# Patient Record
Sex: FEMALE | Race: BLACK OR AFRICAN AMERICAN | NOT HISPANIC OR LATINO | URBAN - METROPOLITAN AREA
[De-identification: names, ages, dates, MRNs, and addresses within clinical notes are randomized per-mention and may not be internally consistent; named-entity substitution may affect disease eponyms.]

---

## 2017-02-16 ENCOUNTER — EMERGENCY (EMERGENCY)
Facility: HOSPITAL | Age: 22
LOS: 0 days | Discharge: HOME | End: 2017-02-16
Attending: EMERGENCY MEDICINE
Payer: OTHER MISCELLANEOUS

## 2017-02-16 VITALS
HEIGHT: 60 IN | WEIGHT: 138.01 LBS | SYSTOLIC BLOOD PRESSURE: 125 MMHG | TEMPERATURE: 98 F | OXYGEN SATURATION: 98 % | RESPIRATION RATE: 19 BRPM | HEART RATE: 82 BPM | DIASTOLIC BLOOD PRESSURE: 77 MMHG
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
## 2017-02-16 DIAGNOSIS — T14.8 OTHER INJURY OF UNSPECIFIED BODY REGION: ICD-10-CM
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
## 2017-02-16 DIAGNOSIS — M54.2 CERVICALGIA: ICD-10-CM

## 2017-02-16 PROCEDURE — 72040 X-RAY EXAM NECK SPINE 2-3 VW: CPT | Mod: 26

## 2017-02-16 PROCEDURE — 99284 EMERGENCY DEPT VISIT MOD MDM: CPT

## 2017-02-16 RX ORDER — IBUPROFEN 200 MG
1 TABLET ORAL
Qty: 15 | Refills: 0 | OUTPATIENT
Start: 2017-02-16 | End: 2017-02-21

## 2017-02-16 RX ORDER — CYCLOBENZAPRINE HYDROCHLORIDE 10 MG/1
1 TABLET, FILM COATED ORAL
Qty: 9 | Refills: 0 | OUTPATIENT
Start: 2017-02-16 | End: 2017-02-19

## 2017-02-16 RX ORDER — CYCLOBENZAPRINE HYDROCHLORIDE 10 MG/1
10 TABLET, FILM COATED ORAL ONCE
Qty: 0 | Refills: 0 | Status: COMPLETED | OUTPATIENT
Start: 2017-02-16 | End: 2017-02-16

## 2017-02-16 RX ORDER — KETOROLAC TROMETHAMINE 30 MG/ML
60 SYRINGE (ML) INJECTION ONCE
Qty: 0 | Refills: 0 | Status: DISCONTINUED | OUTPATIENT
Start: 2017-02-16 | End: 2017-02-16

## 2017-02-16 RX ADMIN — CYCLOBENZAPRINE HYDROCHLORIDE 10 MILLIGRAM(S): 10 TABLET, FILM COATED ORAL at 12:28

## 2017-02-16 RX ADMIN — Medication 60 MILLIGRAM(S): at 12:30

## 2017-02-16 RX ADMIN — Medication 60 MILLIGRAM(S): at 12:28

## 2017-02-16 NOTE — ED PROVIDER NOTE - OBJECTIVE STATEMENT
22yo female with no pertinent pmh presents with left lateral neck pain s/p [facility] no [facility].     No fever/chills. No photophobia/eye pain/changes in vision, No ear pain/sore throat/dysphagia, No chest pain/palpitations. No SOB/cough/stridor. No abdominal pain, N/V/D, no black/bloody bm. No dysuria/frequency/discharge, No headache. No Dizziness.  No rash.  No numbness/tingling/weakness.

## 2017-02-16 NOTE — ED ADULT NURSE NOTE - OBJECTIVE STATEMENT
Patient stated that she injured her neck while lifting up a patient, patient is nursing assistant in [employer], she reported pain of 8 on a scale of 0 to 10, left side of neck that radiates to left shoulder.

## 2017-02-16 NOTE — ED PROVIDER NOTE - MEDICAL DECISION MAKING DETAILS
xray no fx. likely muscle spasm, for nsaids, flexeril. Discussed results and outcome of testing with the patient.  Patient given copy of available results. Patient advised to please follow up with their PMD within the next 24 hours and return to the Emergency Department for worsening symptoms or any other concerns.

## 2017-02-16 NOTE — ED ADULT TRIAGE NOTE - CHIEF COMPLAINT QUOTE
pt complaining of neck pain radiating to left shoulder. pt states pain started after she was lifting a patient

## 2017-02-16 NOTE — ED ADULT NURSE NOTE - ADDITIONAL PRINTED INSTRUCTIONS GIVEN
discharged home, instructed to come back to er if symptoms get worse, verbalized understanding of instructions

## 2017-04-02 ENCOUNTER — INPATIENT (INPATIENT)
Facility: HOSPITAL | Age: 22
LOS: 0 days | Discharge: HOME | DRG: 152 | End: 2017-04-03
Attending: HOSPITALIST | Admitting: STUDENT IN AN ORGANIZED HEALTH CARE EDUCATION/TRAINING PROGRAM
Payer: COMMERCIAL

## 2017-04-02 VITALS
WEIGHT: 149.91 LBS | SYSTOLIC BLOOD PRESSURE: 161 MMHG | TEMPERATURE: 97 F | DIASTOLIC BLOOD PRESSURE: 88 MMHG | RESPIRATION RATE: 18 BRPM | HEART RATE: 98 BPM | OXYGEN SATURATION: 94 % | HEIGHT: 60 IN

## 2017-04-02 DIAGNOSIS — A41.9 SEPSIS, UNSPECIFIED ORGANISM: ICD-10-CM

## 2017-04-02 DIAGNOSIS — Z41.8 ENCOUNTER FOR OTHER PROCEDURES FOR PURPOSES OTHER THAN REMEDYING HEALTH STATE: ICD-10-CM

## 2017-04-02 DIAGNOSIS — J05.10 ACUTE EPIGLOTTITIS WITHOUT OBSTRUCTION: ICD-10-CM

## 2017-04-02 LAB
ALBUMIN SERPL ELPH-MCNC: 4.8 G/DL — SIGNIFICANT CHANGE UP (ref 3.3–5)
ALP SERPL-CCNC: 72 U/L — SIGNIFICANT CHANGE UP (ref 40–120)
ALT FLD-CCNC: 10 U/L RC — SIGNIFICANT CHANGE UP (ref 10–45)
ANION GAP SERPL CALC-SCNC: 16 MMOL/L — SIGNIFICANT CHANGE UP (ref 5–17)
APTT BLD: 28.3 SEC — SIGNIFICANT CHANGE UP (ref 27.5–37.4)
AST SERPL-CCNC: 14 U/L — SIGNIFICANT CHANGE UP (ref 10–40)
BASE EXCESS BLDV CALC-SCNC: -0.9 MMOL/L — SIGNIFICANT CHANGE UP (ref -2–2)
BASOPHILS # BLD AUTO: 0 K/UL — SIGNIFICANT CHANGE UP (ref 0–0.2)
BASOPHILS NFR BLD AUTO: 0 % — SIGNIFICANT CHANGE UP (ref 0–2)
BILIRUB SERPL-MCNC: 0.7 MG/DL — SIGNIFICANT CHANGE UP (ref 0.2–1.2)
BUN SERPL-MCNC: 11 MG/DL — SIGNIFICANT CHANGE UP (ref 7–23)
CA-I SERPL-SCNC: 1.19 MMOL/L — SIGNIFICANT CHANGE UP (ref 1.12–1.3)
CALCIUM SERPL-MCNC: 9.2 MG/DL — SIGNIFICANT CHANGE UP (ref 8.4–10.5)
CHLORIDE BLDV-SCNC: 109 MMOL/L — HIGH (ref 96–108)
CHLORIDE SERPL-SCNC: 105 MMOL/L — SIGNIFICANT CHANGE UP (ref 96–108)
CO2 BLDV-SCNC: 26 MMOL/L — SIGNIFICANT CHANGE UP (ref 22–30)
CO2 SERPL-SCNC: 22 MMOL/L — SIGNIFICANT CHANGE UP (ref 22–31)
CREAT SERPL-MCNC: 0.67 MG/DL — SIGNIFICANT CHANGE UP (ref 0.5–1.3)
EOSINOPHIL # BLD AUTO: 0 K/UL — SIGNIFICANT CHANGE UP (ref 0–0.5)
EOSINOPHIL NFR BLD AUTO: 0.2 % — SIGNIFICANT CHANGE UP (ref 0–6)
GAS PNL BLDV: 138 MMOL/L — SIGNIFICANT CHANGE UP (ref 136–145)
GAS PNL BLDV: SIGNIFICANT CHANGE UP
GLUCOSE BLDV-MCNC: 111 MG/DL — HIGH (ref 70–99)
GLUCOSE SERPL-MCNC: 111 MG/DL — HIGH (ref 70–99)
HCG SERPL-ACNC: <2 MIU/ML — SIGNIFICANT CHANGE UP
HCO3 BLDV-SCNC: 24 MMOL/L — SIGNIFICANT CHANGE UP (ref 21–29)
HCT VFR BLD CALC: 41.3 % — SIGNIFICANT CHANGE UP (ref 34.5–45)
HCT VFR BLDA CALC: 41 % — SIGNIFICANT CHANGE UP (ref 39–50)
HGB BLD CALC-MCNC: 13.4 G/DL — SIGNIFICANT CHANGE UP (ref 11.5–15.5)
HGB BLD-MCNC: 13.6 G/DL — SIGNIFICANT CHANGE UP (ref 11.5–15.5)
INR BLD: 1.12 RATIO — SIGNIFICANT CHANGE UP (ref 0.88–1.16)
LACTATE BLDV-MCNC: 1.8 MMOL/L — SIGNIFICANT CHANGE UP (ref 0.7–2)
LYMPHOCYTES # BLD AUTO: 11.1 % — LOW (ref 13–44)
LYMPHOCYTES # BLD AUTO: 2.3 K/UL — SIGNIFICANT CHANGE UP (ref 1–3.3)
MCHC RBC-ENTMCNC: 29.3 PG — SIGNIFICANT CHANGE UP (ref 27–34)
MCHC RBC-ENTMCNC: 32.9 GM/DL — SIGNIFICANT CHANGE UP (ref 32–36)
MCV RBC AUTO: 89.1 FL — SIGNIFICANT CHANGE UP (ref 80–100)
MONOCYTES # BLD AUTO: 1.3 K/UL — HIGH (ref 0–0.9)
MONOCYTES NFR BLD AUTO: 6.3 % — SIGNIFICANT CHANGE UP (ref 2–14)
NEUTROPHILS # BLD AUTO: 16.8 K/UL — HIGH (ref 1.8–7.4)
NEUTROPHILS NFR BLD AUTO: 82.4 % — HIGH (ref 43–77)
PCO2 BLDV: 44 MMHG — SIGNIFICANT CHANGE UP (ref 35–50)
PH BLDV: 7.36 — SIGNIFICANT CHANGE UP (ref 7.35–7.45)
PLATELET # BLD AUTO: 214 K/UL — SIGNIFICANT CHANGE UP (ref 150–400)
PO2 BLDV: 32 MMHG — SIGNIFICANT CHANGE UP (ref 25–45)
POTASSIUM BLDV-SCNC: 3.3 MMOL/L — LOW (ref 3.5–5)
POTASSIUM SERPL-MCNC: 3.6 MMOL/L — SIGNIFICANT CHANGE UP (ref 3.5–5.3)
POTASSIUM SERPL-SCNC: 3.6 MMOL/L — SIGNIFICANT CHANGE UP (ref 3.5–5.3)
PROT SERPL-MCNC: 7.9 G/DL — SIGNIFICANT CHANGE UP (ref 6–8.3)
PROTHROM AB SERPL-ACNC: 12.1 SEC — SIGNIFICANT CHANGE UP (ref 9.8–12.7)
RAPID RVP RESULT: SIGNIFICANT CHANGE UP
RBC # BLD: 4.63 M/UL — SIGNIFICANT CHANGE UP (ref 3.8–5.2)
RBC # FLD: 11.2 % — SIGNIFICANT CHANGE UP (ref 10.3–14.5)
SAO2 % BLDV: 54 % — LOW (ref 67–88)
SODIUM SERPL-SCNC: 143 MMOL/L — SIGNIFICANT CHANGE UP (ref 135–145)
WBC # BLD: 20.3 K/UL — HIGH (ref 3.8–10.5)
WBC # FLD AUTO: 20.3 K/UL — HIGH (ref 3.8–10.5)

## 2017-04-02 PROCEDURE — 99291 CRITICAL CARE FIRST HOUR: CPT

## 2017-04-02 PROCEDURE — 70360 X-RAY EXAM OF NECK: CPT | Mod: 26

## 2017-04-02 RX ORDER — DEXAMETHASONE 0.5 MG/5ML
4 ELIXIR ORAL ONCE
Qty: 0 | Refills: 0 | Status: COMPLETED | OUTPATIENT
Start: 2017-04-02 | End: 2017-04-02

## 2017-04-02 RX ORDER — VANCOMYCIN HCL 1 G
1000 VIAL (EA) INTRAVENOUS EVERY 12 HOURS
Qty: 0 | Refills: 0 | Status: DISCONTINUED | OUTPATIENT
Start: 2017-04-02 | End: 2017-04-02

## 2017-04-02 RX ORDER — DEXAMETHASONE 0.5 MG/5ML
10 ELIXIR ORAL EVERY 12 HOURS
Qty: 0 | Refills: 0 | Status: DISCONTINUED | OUTPATIENT
Start: 2017-04-02 | End: 2017-04-03

## 2017-04-02 RX ORDER — CEFTRIAXONE 500 MG/1
1 INJECTION, POWDER, FOR SOLUTION INTRAMUSCULAR; INTRAVENOUS ONCE
Qty: 0 | Refills: 0 | Status: COMPLETED | OUTPATIENT
Start: 2017-04-02 | End: 2017-04-02

## 2017-04-02 RX ORDER — VANCOMYCIN HCL 1 G
VIAL (EA) INTRAVENOUS
Qty: 0 | Refills: 0 | Status: DISCONTINUED | OUTPATIENT
Start: 2017-04-02 | End: 2017-04-02

## 2017-04-02 RX ORDER — CEFTRIAXONE 500 MG/1
1 INJECTION, POWDER, FOR SOLUTION INTRAMUSCULAR; INTRAVENOUS EVERY 24 HOURS
Qty: 0 | Refills: 0 | Status: DISCONTINUED | OUTPATIENT
Start: 2017-04-02 | End: 2017-04-02

## 2017-04-02 RX ORDER — VANCOMYCIN HCL 1 G
1000 VIAL (EA) INTRAVENOUS ONCE
Qty: 0 | Refills: 0 | Status: COMPLETED | OUTPATIENT
Start: 2017-04-02 | End: 2017-04-02

## 2017-04-02 RX ORDER — DEXAMETHASONE 0.5 MG/5ML
10 ELIXIR ORAL EVERY 6 HOURS
Qty: 0 | Refills: 0 | Status: DISCONTINUED | OUTPATIENT
Start: 2017-04-02 | End: 2017-04-02

## 2017-04-02 RX ORDER — DEXAMETHASONE 0.5 MG/5ML
6 ELIXIR ORAL ONCE
Qty: 0 | Refills: 0 | Status: COMPLETED | OUTPATIENT
Start: 2017-04-02 | End: 2017-04-02

## 2017-04-02 RX ORDER — FAMOTIDINE 10 MG/ML
20 INJECTION INTRAVENOUS ONCE
Qty: 0 | Refills: 0 | Status: COMPLETED | OUTPATIENT
Start: 2017-04-02 | End: 2017-04-02

## 2017-04-02 RX ORDER — CEFTRIAXONE 500 MG/1
2 INJECTION, POWDER, FOR SOLUTION INTRAMUSCULAR; INTRAVENOUS EVERY 24 HOURS
Qty: 0 | Refills: 0 | Status: DISCONTINUED | OUTPATIENT
Start: 2017-04-02 | End: 2017-04-03

## 2017-04-02 RX ORDER — SODIUM CHLORIDE 9 MG/ML
1000 INJECTION, SOLUTION INTRAVENOUS
Qty: 0 | Refills: 0 | Status: DISCONTINUED | OUTPATIENT
Start: 2017-04-02 | End: 2017-04-03

## 2017-04-02 RX ORDER — DEXAMETHASONE 0.5 MG/5ML
10 ELIXIR ORAL EVERY 8 HOURS
Qty: 0 | Refills: 0 | Status: DISCONTINUED | OUTPATIENT
Start: 2017-04-02 | End: 2017-04-02

## 2017-04-02 RX ORDER — DIPHENHYDRAMINE HCL 50 MG
25 CAPSULE ORAL ONCE
Qty: 0 | Refills: 0 | Status: COMPLETED | OUTPATIENT
Start: 2017-04-02 | End: 2017-04-02

## 2017-04-02 RX ADMIN — Medication 25 MILLIGRAM(S): at 05:50

## 2017-04-02 RX ADMIN — SODIUM CHLORIDE 50 MILLILITER(S): 9 INJECTION, SOLUTION INTRAVENOUS at 15:54

## 2017-04-02 RX ADMIN — FAMOTIDINE 20 MILLIGRAM(S): 10 INJECTION INTRAVENOUS at 05:50

## 2017-04-02 RX ADMIN — Medication 102 MILLIGRAM(S): at 14:20

## 2017-04-02 RX ADMIN — Medication 6 MILLIGRAM(S): at 05:50

## 2017-04-02 RX ADMIN — SODIUM CHLORIDE 50 MILLILITER(S): 9 INJECTION, SOLUTION INTRAVENOUS at 23:22

## 2017-04-02 RX ADMIN — CEFTRIAXONE 100 GRAM(S): 500 INJECTION, POWDER, FOR SOLUTION INTRAMUSCULAR; INTRAVENOUS at 06:22

## 2017-04-02 RX ADMIN — Medication 4 MILLIGRAM(S): at 06:23

## 2017-04-02 RX ADMIN — Medication 250 MILLIGRAM(S): at 07:30

## 2017-04-02 NOTE — ED PROVIDER NOTE - MEDICAL DECISION MAKING DETAILS
Resident: throat pain, change in voice will have ENT scope patient, benadryl, steroid, and pepcid Resident: throat pain, change in voice will have ENT scope patient, benadryl, steroid, and pepcid    ATTENDING MD Jose Guadalupe: throat pain, no hx of angioedema or prior reaction. no hard signs of upper airway compromise. pt tachyardic and mildly il appearing. will get ENT to perform nasopharyngoscopy, get imaging PRN. Will get labs and infectious workup.

## 2017-04-02 NOTE — ED PROVIDER NOTE - PROGRESS NOTE DETAILS
ENT notes epiglotitis. We will get X-ray, MICU eval, antibitiocs [facility]

## 2017-04-02 NOTE — H&P ADULT. - LYMPHATIC
anterior cervical L/supraclavicular R/anterior cervical R/posterior cervical L/posterior cervical R/supraclavicular L

## 2017-04-02 NOTE — ED PROVIDER NOTE - OBJECTIVE STATEMENT
21 year old female presents with ear pain, throat pain, throat tightness, change in voice over 1 day period of time. No drooling, no stridor.  No fever, no nausea/vomiting, no shortness of breath. 21 year old female presents with ear pain, throat pain, throat tightness, change in voice over 1 day period of time per family. No drooling, no stridor.  No dysphagia, mild odynophagia to solids. No fever, no nausea/vomiting, no shortness of breath. Thinks she had a smilar episode int he past from a "reaction to something."

## 2017-04-02 NOTE — ED PROVIDER NOTE - ATTENDING CONTRIBUTION TO CARE
ATTENDING MD:  I, Dima Shi, personally have seen and examined this patient.  I have discussed all aspects of care with the resident physician. Resident note reviewed and agree on plan of care and except where noted.  See HPI, PE, and MDM for details.

## 2017-04-02 NOTE — ED PROVIDER NOTE - PHYSICAL EXAMINATION
ATTENDING MD Jose Guadalupe: Generally well appearing, uncomfortable but nontoxic, AOx4. No appreciable dysphonia, no drooling, tolerating secretions. No stridor. No respirator distress. Lungs CTAB. Mild tracheal tenderness. Heart is tachycardic but regular, 2+ distal pulses. Abd is soft, nontender. No CVAT. normal mentation, grossly neurologically intact.    RESIDENT:

## 2017-04-02 NOTE — ED PROVIDER NOTE - CRITICAL CARE PROVIDED
review of prior documents/direct patient care (not related to procedure)/additional history taking/consultation with other physicians/consult w/ pt's family directly relating to pts condition

## 2017-04-02 NOTE — H&P ADULT. - PROBLEM SELECTOR PLAN 2
DVT: HSQ Pt to be admitted to the MICU for airway monitoring in the setting of epiglottitis. RVP panel pending. Continue with IV decadron per ENT recs. VBG shows now evidence of respiratory failure at this time. Patient with elevated white count in the setting of possible infection. Continue with vancomycin. Keep patient NPO. Pt to be admitted to the MICU for airway monitoring in the setting of epiglottitis. RVP panel pending. Continue with IV decadron per ENT recs. VBG shows now evidence of respiratory failure at this time. Patient with elevated white count in the setting of possible infection. Continue with vancomycin, Ceftriaxone. Keep patient NPO. Pt to be admitted to the MICU for airway monitoring in the setting of epiglottitis. RVP panel pending. Continue with IV decadron per ENT recs. VBG shows no evidence of respiratory failure at this time. Patient phonating, tolerating secretions well, can lie at 20 degrees comfortably.  Patient with elevated white count in the setting of possible infection. Continue with Ceftriaxone. Keep patient NPO.

## 2017-04-02 NOTE — ED ADULT NURSE NOTE - OBJECTIVE STATEMENT
20 yo female presents to the ED from home c/o sharp throat pain with left ear pain starting yesterday at 0900. patient states throat pain progressively worsened overnight and now pain is 10/10 when swallowing. patient is AAOx4. lung sounds clear bilaterally, cap refill <3sec. throat is pink in color, no discharge present, neck not tender to touch. patient denies difficulty breathing, SOB, chest pain, N/V/D, HA, chills, cough, HA. MD at the bedside. 22 yo female presents to the ED from home c/o sharp throat pain with left ear pain starting yesterday at 0900. patient states throat pain progressively worsened overnight and now pain is 10/10 when swallowing. patient is AAOx4. lung sounds clear bilaterally, cap refill <3sec. throat is pink in color, no discharge present, neck tender to touch on left side. patient denies difficulty breathing, SOB, chest pain, N/V/D, HA, chills, cough, HA. MD at the bedside.

## 2017-04-02 NOTE — H&P ADULT. - PROBLEM SELECTOR PLAN 1
Pt to be admitted to the MICU for airway monitoring in the setting of epiglottitis. RVP panel pending. Continue with IV decadron per ENT recs. VBG show now evidence of respiratory failure at this time. Patient with elevated white count in the setting of possible infection. Pt to be admitted to the MICU for airway monitoring in the setting of epiglottitis. RVP panel pending. Continue with IV decadron per ENT recs. VBG show now evidence of respiratory failure at this time. Patient with elevated white count in the setting of possible infection. Continue with vancomycin. Meets sepsis criteria based off of Tachycardia, leukocytosis, source of infection (epiglottitis). Will obtain RVP panel, continue vancomycin. BCx pending. Meets sepsis criteria based off of Tachycardia, leukocytosis, source of infection (epiglottitis). Will obtain RVP panel, continue vancomycin, Ceftriaxone. BCx pending. IVF. Meets sepsis criteria based off of Tachycardia, leukocytosis, source of infection (epiglottitis). Will obtain RVP panel, strep culture, continue Ceftriaxone. BCx pending. IVF.

## 2017-04-02 NOTE — H&P ADULT. - HISTORY OF PRESENT ILLNESS
22 yo F no PMH developed L ear pain at 10 am last night which progressed to throat pain. There is severe pain with swallowing and the patient feels the need to spit frequently. The patient is able to drink liquids without coughing or choking. The patient denies any fevers, chills , rhinorrhea. Denies any sick contacts.    In the ED the patient's vitals were:    T 36.3 /88 HR: 98 RR 18 94%RA.    The patient was given 1G ceftriaxone, Vancomycin, 10 mg dexamethasone, benadryl, famotidine. An X ray of the neck was completed which showed Thickening of the epiglottis concerning for epiglottitis. Pt seen by ENT in the ED who rec continuing IV decadron and ICU consultation regarding airway monitoring. 22 yo F no PMH developed L ear pain at 10 am last night which progressed to throat pain. There is severe pain with swallowing and the patient feels the need to spit frequently. The patient is able to drink liquids without coughing or choking. The patient denies any fevers, chills , rhinorrhea. Denies any sick contacts.    In the ED the patient's vitals were:    T 36.3 /88 HR: 98 RR 18 94%RA.    The patient was given 1G ceftriaxone, Vancomycin, 10 mg dexamethasone, benadryl, famotidine. An X ray of the neck was completed which showed Thickening of the epiglottis concerning for epiglottitis. Pt seen by ENT in the ED who rec continuing IV decadron and ICU consultation regarding airway monitoring. ENT noted enlarged epiglottis with no pooling of secretions. Patent airways, with mobile vocal cords. Rec NPO. 22 yo F no PMH developed L ear pain at 10 am yesterday morning which progressed to throat pain. There is severe pain with swallowing and the patient feels the need to spit frequently. The patient is able to drink liquids without coughing or choking. The patient denies any fevers, chills , rhinorrhea. Denies any sick contacts. Patient denies any stridor or wheezing. States she feels as though she has had some change in her voice acutely.    In the ED the patient's vitals were:    T 36.3 /88 HR: 98 RR 18 94%RA.    The patient was given 1G ceftriaxone, Vancomycin, 10 mg dexamethasone, benadryl, famotidine. An X ray of the neck was completed which showed Thickening of the epiglottis concerning for epiglottitis. Pt seen by ENT in the ED who rec continuing IV decadron and ICU consultation regarding airway monitoring. ENT noted enlarged epiglottis with no pooling of secretions. Patent airways, with mobile vocal cords. Rec NPO. 22 yo F no PMH developed L ear pain at 10 am yesterday morning which progressed to throat pain. There is severe pain with swallowing and the patient feels the need to spit frequently. The patient is able to drink liquids without coughing or choking. The patient denies any fevers, chills , rhinorrhea. Denies any sick contacts. Patient denies any stridor or wheezing. States she feels as though she has had some change in her voice acutely.    In the ED the patient's vitals were:    T 36.3 /88 HR: 98 RR 18 94%RA.    The patient was given 1G ceftriaxone, Vancomycin, 10 mg dexamethasone, benadryl, famotidine. An X ray of the neck was completed which showed Thickening of the epiglottis concerning for epiglottitis. Pt seen by ENT in the ED who rec continuing IV decadron and ICU consultation regarding airway monitoring. ENT noted erythematous edematous beefy epiglottis with no pooling of secretions. Patent airways, with mobile vocal cords. Rec NPO.

## 2017-04-03 VITALS
TEMPERATURE: 98 F | HEART RATE: 80 BPM | SYSTOLIC BLOOD PRESSURE: 126 MMHG | DIASTOLIC BLOOD PRESSURE: 70 MMHG | RESPIRATION RATE: 18 BRPM | OXYGEN SATURATION: 99 %

## 2017-04-03 PROCEDURE — 80053 COMPREHEN METABOLIC PANEL: CPT

## 2017-04-03 PROCEDURE — 82947 ASSAY GLUCOSE BLOOD QUANT: CPT

## 2017-04-03 PROCEDURE — 85027 COMPLETE CBC AUTOMATED: CPT

## 2017-04-03 PROCEDURE — 96375 TX/PRO/DX INJ NEW DRUG ADDON: CPT

## 2017-04-03 PROCEDURE — 84702 CHORIONIC GONADOTROPIN TEST: CPT

## 2017-04-03 PROCEDURE — 84132 ASSAY OF SERUM POTASSIUM: CPT

## 2017-04-03 PROCEDURE — 99239 HOSP IP/OBS DSCHRG MGMT >30: CPT

## 2017-04-03 PROCEDURE — 85730 THROMBOPLASTIN TIME PARTIAL: CPT

## 2017-04-03 PROCEDURE — 87040 BLOOD CULTURE FOR BACTERIA: CPT

## 2017-04-03 PROCEDURE — 87486 CHLMYD PNEUM DNA AMP PROBE: CPT

## 2017-04-03 PROCEDURE — 82435 ASSAY OF BLOOD CHLORIDE: CPT

## 2017-04-03 PROCEDURE — 99285 EMERGENCY DEPT VISIT HI MDM: CPT | Mod: 25

## 2017-04-03 PROCEDURE — 82330 ASSAY OF CALCIUM: CPT

## 2017-04-03 PROCEDURE — 85014 HEMATOCRIT: CPT

## 2017-04-03 PROCEDURE — 87581 M.PNEUMON DNA AMP PROBE: CPT

## 2017-04-03 PROCEDURE — 87633 RESP VIRUS 12-25 TARGETS: CPT

## 2017-04-03 PROCEDURE — 96376 TX/PRO/DX INJ SAME DRUG ADON: CPT

## 2017-04-03 PROCEDURE — 87081 CULTURE SCREEN ONLY: CPT

## 2017-04-03 PROCEDURE — 84295 ASSAY OF SERUM SODIUM: CPT

## 2017-04-03 PROCEDURE — 85610 PROTHROMBIN TIME: CPT

## 2017-04-03 PROCEDURE — 82803 BLOOD GASES ANY COMBINATION: CPT

## 2017-04-03 PROCEDURE — 83605 ASSAY OF LACTIC ACID: CPT

## 2017-04-03 PROCEDURE — 87798 DETECT AGENT NOS DNA AMP: CPT

## 2017-04-03 PROCEDURE — 96374 THER/PROPH/DIAG INJ IV PUSH: CPT

## 2017-04-03 PROCEDURE — 70360 X-RAY EXAM OF NECK: CPT

## 2017-04-03 RX ADMIN — Medication 102 MILLIGRAM(S): at 03:07

## 2017-04-03 RX ADMIN — CEFTRIAXONE 100 GRAM(S): 500 INJECTION, POWDER, FOR SOLUTION INTRAMUSCULAR; INTRAVENOUS at 05:40

## 2017-04-03 RX ADMIN — Medication 102 MILLIGRAM(S): at 14:09

## 2017-04-03 NOTE — DISCHARGE NOTE ADULT - HOSPITAL COURSE
21F no significant PMH presents for severe throat pain with associated difficulty swallowing and decreased ability to tolerate secretions. CT neck showed 21F no significant PMH presents for severe throat pain with associated difficulty swallowing and decreased ability to tolerate secretions. She also was experiencing ear pain. CT neck showed swelling of the epiglottis. ENT evaluated and recommended ICU monitoring in case of airway compromise. She was started on CTX and vancomycin empirically. She was given decadron. The following day, she had interval improvement in her edema. Her steroids were tapered, and she was cleared for discharge. She was discharged on a 10 day course of Augmentin and steroid taper with instructions to follow up with her PMD this week. She was instructed to return to the ED if she experienced worsened swelling, pain, or difficulty breathing. 21F no significant PMH presents for severe throat pain with associated difficulty swallowing and decreased ability to tolerate secretions. She also was experiencing ear pain. CT neck showed swelling of the epiglottis. ENT evaluated and recommended ICU monitoring in case of airway compromise. She was started on CTX and vancomycin empirically. She was given decadron. The following day, she had interval improvement in her edema. Her steroids were tapered, and she was cleared for discharge. She was discharged on a 7 day course of Clindamycin and steroid taper with instructions to follow up with her PMD this week. She was instructed to return to the ED if she experienced worsened swelling, pain, or difficulty breathing. She was instructed to follow up with ENT. 21F no significant PMH presents for severe throat pain with associated difficulty swallowing and decreased ability to tolerate secretions. She also was experiencing ear pain. CT neck showed swelling of the epiglottis. ENT evaluated and recommended ICU monitoring in case of airway compromise. She was started on CTX and vancomycin empirically. She was given decadron. The following day, she had interval improvement in her edema. Her steroids were tapered, and she was cleared for discharge. She was discharged on a 7 day course of Clindamycin and steroid taper with instructions to follow up with her PMD this week. She was instructed to return to the ED if she experienced worsened swelling, pain, or difficulty breathing. She was instructed to follow up with ENT.    post d/c addendum: Sepsis not present on admission

## 2017-04-03 NOTE — DISCHARGE NOTE ADULT - MEDICATION SUMMARY - MEDICATIONS TO TAKE
I will START or STAY ON the medications listed below when I get home from the hospital:    predniSONE 10 mg oral tablet  -- 4 tabs by mouth once a day x 4 days  3 tabs by mouth once a day x 4 days  2 tabs by mouth once a day x 4 days  1 tabs by mouth once a day x 4 days  -- It is very important that you take or use this exactly as directed.  Do not skip doses or discontinue unless directed by your doctor.  Obtain medical advice before taking any non-prescription drugs as some may affect the action of this medication.  Take with food or milk.    -- Indication: For Epiglottitis     mg oral tablet  -- 1 tab(s) by mouth every 8 hours PRN for pain  -- Do not take this drug if you are pregnant.  It is very important that you take or use this exactly as directed.  Do not skip doses or discontinue unless directed by your doctor.  May cause drowsiness or dizziness.  Obtain medical advice before taking any non-prescription drugs as some may affect the action of this medication.  Take with food or milk.      -- Indication: For Pain    cyclobenzaprine 10 mg oral tablet  -- 1 tab(s) by mouth 3 times a day PRN for pain  -- May cause drowsiness.  Alcohol may intensify this effect.  Use care when operating dangerous machinery.  Obtain medical advice before taking any non-prescription drugs as some may affect the action of this medication.      -- Indication: For Muscle spasms    amoxicillin-clavulanate 875 mg-125 mg oral tablet  -- 1 tab(s) by mouth 2 times a day  -- Indication: For Epiglottitis I will START or STAY ON the medications listed below when I get home from the hospital:    predniSONE 10 mg oral tablet  -- 4 tabs by mouth once a day x 4 days  3 tabs by mouth once a day x 4 days  2 tabs by mouth once a day x 4 days  1 tabs by mouth once a day x 4 days  -- It is very important that you take or use this exactly as directed.  Do not skip doses or discontinue unless directed by your doctor.  Obtain medical advice before taking any non-prescription drugs as some may affect the action of this medication.  Take with food or milk.    -- Indication: For Epiglottitis     mg oral tablet  -- 1 tab(s) by mouth every 8 hours PRN for pain  -- Do not take this drug if you are pregnant.  It is very important that you take or use this exactly as directed.  Do not skip doses or discontinue unless directed by your doctor.  May cause drowsiness or dizziness.  Obtain medical advice before taking any non-prescription drugs as some may affect the action of this medication.  Take with food or milk.      -- Indication: For Pain    clindamycin 300 mg oral capsule  -- 2 cap(s) by mouth 3 times a day for 7 days.  -- Finish all this medication unless otherwise directed by prescriber.  Medication should be taken with plenty of water.    -- Indication: For Epiglottitis    cyclobenzaprine 10 mg oral tablet  -- 1 tab(s) by mouth 3 times a day PRN for pain  -- May cause drowsiness.  Alcohol may intensify this effect.  Use care when operating dangerous machinery.  Obtain medical advice before taking any non-prescription drugs as some may affect the action of this medication.      -- Indication: For Muscle spasms

## 2017-04-03 NOTE — DISCHARGE NOTE ADULT - CARE PROVIDER_API CALL
PRISCILA,   Phone: (   )    -  Fax: (   )    - PMD,   Phone: (   )    -  Fax: (   )    -    Howard Chavez), Otolaryngology  [address] [place] [address]  Phone: [phone]  Fax: [phone]

## 2017-04-03 NOTE — DISCHARGE NOTE ADULT - PLAN OF CARE
Follow up with your primary care physician within 3-5 days. You had a severe throat infection. Complete your course of antibiotics and steroids as prescribed. Complete your course of steroids as prescribed. You had a severe throat infection. Complete your course of antibiotics and steroids as prescribed. Complete your course of steroids as prescribed. Follow up with your primary care doctor within 3-5 days of discharge. Return to the ED if you experience worsening throat pain, throat swelling, or difficulty breathing. You had a severe throat infection. Complete your course of antibiotics and steroids as prescribed. Complete your course of steroids as prescribed. Follow up with your primary care doctor within 3-5 days of discharge. Return to the ED if you experience worsening throat pain, throat swelling, or difficulty breathing. Follow up with ENT Dr. Chavez within 2 weeks of discharge.

## 2017-04-03 NOTE — DISCHARGE NOTE ADULT - PATIENT PORTAL LINK FT
“You can access the [facility] Patient Portal, offered by [facility], by registering with the following website: [email or web address]”

## 2017-04-03 NOTE — DISCHARGE NOTE ADULT - CARE PLAN
Principal Discharge DX:	Epiglottitis  Goal:	Follow up with your primary care physician within 3-5 days.  Instructions for follow-up, activity and diet:	You had a severe throat infection. Complete your course of antibiotics and steroids as prescribed. Complete your course of steroids as prescribed. Principal Discharge DX:	Epiglottitis  Goal:	Follow up with your primary care physician within 3-5 days.  Instructions for follow-up, activity and diet:	You had a severe throat infection. Complete your course of antibiotics and steroids as prescribed. Complete your course of steroids as prescribed. Follow up with your primary care doctor within 3-5 days of discharge. Return to the ED if you experience worsening throat pain, throat swelling, or difficulty breathing. Principal Discharge DX:	Epiglottitis  Goal:	Follow up with your primary care physician within 3-5 days.  Instructions for follow-up, activity and diet:	You had a severe throat infection. Complete your course of antibiotics and steroids as prescribed. Complete your course of steroids as prescribed. Follow up with your primary care doctor within 3-5 days of discharge. Return to the ED if you experience worsening throat pain, throat swelling, or difficulty breathing. Follow up with ENT Dr. Chavez within 2 weeks of discharge.

## 2017-04-03 NOTE — DISCHARGE NOTE ADULT - PROVIDER TOKENS
FREE:[LAST:[PMD],PHONE:[(   )    -],FAX:[(   )    -]] FREE:[LAST:[PMD],PHONE:[(   )    -],FAX:[(   )    -]],TOKEN:'[id number]'

## 2017-04-04 LAB
CULTURE RESULTS: SIGNIFICANT CHANGE UP
SPECIMEN SOURCE: SIGNIFICANT CHANGE UP

## 2017-04-07 LAB
CULTURE RESULTS: SIGNIFICANT CHANGE UP
CULTURE RESULTS: SIGNIFICANT CHANGE UP
SPECIMEN SOURCE: SIGNIFICANT CHANGE UP
SPECIMEN SOURCE: SIGNIFICANT CHANGE UP

## 2017-04-08 ENCOUNTER — EMERGENCY (EMERGENCY)
Facility: HOSPITAL | Age: 22
LOS: 1 days | Discharge: HOME | End: 2017-04-08
Attending: EMERGENCY MEDICINE | Admitting: EMERGENCY MEDICINE
Payer: COMMERCIAL

## 2017-04-08 VITALS
DIASTOLIC BLOOD PRESSURE: 71 MMHG | RESPIRATION RATE: 20 BRPM | SYSTOLIC BLOOD PRESSURE: 130 MMHG | HEART RATE: 73 BPM | TEMPERATURE: 98 F | OXYGEN SATURATION: 100 %

## 2017-04-08 DIAGNOSIS — R07.0 PAIN IN THROAT: ICD-10-CM

## 2017-04-08 DIAGNOSIS — Z79.899 OTHER LONG TERM (CURRENT) DRUG THERAPY: ICD-10-CM

## 2017-04-08 PROCEDURE — 96374 THER/PROPH/DIAG INJ IV PUSH: CPT

## 2017-04-08 PROCEDURE — 99284 EMERGENCY DEPT VISIT MOD MDM: CPT | Mod: 25

## 2017-04-08 PROCEDURE — 96375 TX/PRO/DX INJ NEW DRUG ADDON: CPT

## 2017-04-08 RX ORDER — DEXAMETHASONE 0.5 MG/5ML
12 ELIXIR ORAL ONCE
Qty: 0 | Refills: 0 | Status: COMPLETED | OUTPATIENT
Start: 2017-04-08 | End: 2017-04-08

## 2017-04-08 RX ADMIN — Medication 100 MILLIGRAM(S): at 23:48

## 2017-04-08 RX ADMIN — Medication 106 MILLIGRAM(S): at 23:19

## 2017-04-08 NOTE — ED ADULT TRIAGE NOTE - ARRIVAL INFO ADDITIONAL COMMENTS
pt states symptoms started after eating chinese food/vss/stopped taking clinda x 2days and took one tonight

## 2017-04-08 NOTE — ED PROVIDER NOTE - OBJECTIVE STATEMENT
21F recently a/w epiglottitis p/w foreign body sensation in throat after eating Chinese food this evening. Pt states that after discharge she had been swallowing without issue but today noted the symptoms after eating fried rice. No known food allergies and has eaten that meal at that restaurant many times in the past without issue. Denies shortness of breath or difficulty handling secretions. Of note, pt confused by discharge instructions of antibiotics and steroid dosing, has not been taking appropriately since dc (took clinda once a day and one prednisone tab daily). Reports daily marijuana use, "a few puffs at a time," last used this AM.

## 2017-04-08 NOTE — ED PROVIDER NOTE - MEDICAL DECISION MAKING DETAILS
MD Fermin,Attending: pt seen and examined . agree with above HPI/ROs/PE. Pt admitted overnite on 2 April for endoscopically verified epiglotittis and d/c home on 3 Aprilon prednisone taper/clindamycin and cyclobenzaprine. Ate Chinese food tonite (fried rice) --felt like it was going down the wrong way, vomited multiple times and now feels discomfort in throat. No fever/chills. Able to swallow. airway patent. oropharynx NAD . Likely irritation of already inflamed epiglottitis by vomiting. No suspicion for FB. Pt did miss one day of prednisone yesterday and was encouraged to take medications completely as prescribed until finished. Return for increased pain/trouble swallowing/change in voice or any other concerns and follow up with ENT as scheduled MD Fermin,Attending: pt seen and examined . agree with above HPI/ROs/PE. Pt admitted overnite on 2 April for endoscopically verified epiglotittis and d/c home on 3 Aprilon prednisone taper/clindamycin and cyclobenzaprine. Ate Chinese food tonite (fried rice) --felt like it was going down the wrong way, vomited multiple times and now feels discomfort in throat. No fever/chills. Able to swallow. airway patent. oropharynx NAD . Likely irritation of already inflamed epiglottitis by vomiting. No suspicion for FB. Pt confused bout scripts and has not been taking clindamycin or prednisone as prescribed (confused with dose).was encouraged to take medications completely as prescribed until finished. Return for increased pain/trouble swallowing/change in voice or any other concerns and follow up with ENT as scheduled. for IV Clindamycin and dexamthasone once here prior to d/c. MD Fermin,Attending: pt seen and examined . agree with above HPI/ROs/PE. Pt admitted overnite on 2 April for endoscopically verified epiglotittis and d/c home on 3 Aprilon prednisone taper/clindamycin and cyclobenzaprine. Ate Chinese food tonite (fried rice) --felt like it was going down the wrong way, vomited multiple times and now feels discomfort in throat. No fever/chills. Able to swallow. airway patent. oropharynx NAD . Likely irritation of already inflamed epiglottitis by vomiting. No suspicion for FB. Pt confused bout scripts and has not been taking clindamycin or prednisone as prescribed (confused with dose).was encouraged to take medications completely as prescribed until finished. Return for increased pain/trouble swallowing/change in voice or any other concerns and follow up with ENT as scheduled. for IV Clindamycin and dexamethasone once here prior to d/c.

## 2017-04-08 NOTE — ED ADULT NURSE NOTE - OBJECTIVE STATEMENT
2201 pt 21y f aox3 vss at triage walked in c/o "my throat feels funny after eating chinese rice" was in icu last week for epiglottitis? issued clinda antibx had stopped for 2days bec of vag itching/diarrhea, took one tonight before coming to ed, pending eval/gcruz Pt is a 20 yo female with the c.o feeling as if something is caught in her throat. Pt was recently admitted to the ICU dx with epiglottis. She was d/john with steroids and an antibiotic. Pt states that after eating rice for dinner she felt as if something was struck. She had two episodes of vomiting at home. She Pt is a 20 yo female with the c.o feeling as if something is caught in her throat. Pt was recently admitted to the ICU dx with epiglottis. She was d/john with steroids and an antibiotic. Pt states that after eating rice for dinner she felt as if something was struck. She had two episodes of vomiting at home. She states that she hasn't been taking the medications correctly and missed a dose yesterday. Pts O2 sat is at 100% she is maintaining her secretions she denies any diff breathing. She is currently denying any N/V/D no CP or SOB no cough fever or chills. Pt denies any pmh and is up to date with her vaccines

## 2017-04-09 VITALS
OXYGEN SATURATION: 100 % | DIASTOLIC BLOOD PRESSURE: 82 MMHG | SYSTOLIC BLOOD PRESSURE: 146 MMHG | TEMPERATURE: 98 F | HEART RATE: 86 BPM | RESPIRATION RATE: 18 BRPM

## 2017-07-20 ENCOUNTER — EMERGENCY (EMERGENCY)
Facility: HOSPITAL | Age: 22
LOS: 0 days | Discharge: HOME | End: 2017-07-20
Attending: EMERGENCY MEDICINE
Payer: OTHER MISCELLANEOUS

## 2017-07-20 VITALS
SYSTOLIC BLOOD PRESSURE: 107 MMHG | DIASTOLIC BLOOD PRESSURE: 63 MMHG | HEART RATE: 76 BPM | TEMPERATURE: 99 F | RESPIRATION RATE: 16 BRPM | OXYGEN SATURATION: 98 % | HEIGHT: 60 IN | WEIGHT: 138.01 LBS

## 2017-07-20 DIAGNOSIS — M54.9 DORSALGIA, UNSPECIFIED: ICD-10-CM
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
## 2017-07-20 DIAGNOSIS — S30.0XXA CONTUSION OF LOWER BACK AND PELVIS, INITIAL ENCOUNTER: ICD-10-CM

## 2017-07-20 DIAGNOSIS — S70.02XA CONTUSION OF LEFT HIP, INITIAL ENCOUNTER: ICD-10-CM

## 2017-07-20 PROCEDURE — 99283 EMERGENCY DEPT VISIT LOW MDM: CPT

## 2017-07-20 RX ORDER — IBUPROFEN 200 MG
1 TABLET ORAL
Qty: 20 | Refills: 0 | OUTPATIENT
Start: 2017-07-20

## 2017-07-20 RX ORDER — METHOCARBAMOL 500 MG/1
1 TABLET, FILM COATED ORAL
Qty: 15 | Refills: 0 | OUTPATIENT
Start: 2017-07-20 | End: 2017-07-25

## 2017-07-20 RX ORDER — IBUPROFEN 200 MG
600 TABLET ORAL ONCE
Qty: 0 | Refills: 0 | Status: COMPLETED | OUTPATIENT
Start: 2017-07-20 | End: 2017-07-20

## 2017-07-20 RX ORDER — METHOCARBAMOL 500 MG/1
750 TABLET, FILM COATED ORAL ONCE
Qty: 0 | Refills: 0 | Status: COMPLETED | OUTPATIENT
Start: 2017-07-20 | End: 2017-07-20

## 2017-07-20 RX ADMIN — Medication 600 MILLIGRAM(S): at 10:03

## 2017-07-20 RX ADMIN — METHOCARBAMOL 750 MILLIGRAM(S): 500 TABLET, FILM COATED ORAL at 10:03

## 2017-07-20 NOTE — ED ADULT NURSE NOTE - OBJECTIVE STATEMENT
pt received alert and oriented x3, pt states she slipped while at work and fell and hurt her left side of hip and back

## 2017-07-20 NOTE — ED PROVIDER NOTE - MEDICAL DECISION MAKING DETAILS
21yo female s/p fall with soft tissue contusions; no evidence of bony derformity; +NVI improved with NSAIDS and muscle relaxants

## 2017-07-20 NOTE — ED PROVIDER NOTE - OBJECTIVE STATEMENT
21yo female s/p slip and fall on wet floor 2 days ago while at work.  Pt states she fell onto her left side bracing herself prior to hitting her head; no subsequent LOC, n/v, dizziness, numbness/weakness/tingling to ext.  Pt states that she has been ambulatory since the incident but report 8/10 achy pain greatest to the left thigh but involves the entire left side. no urinary or fecal incontinence

## 2017-07-20 NOTE — ED PROVIDER NOTE - CHPI ED SYMPTOMS NEG
no bleeding/no fever/no numbness/no vomiting/no confusion/no deformity/no loss of consciousness/no tingling/no weakness

## 2017-08-03 PROBLEM — Z00.00 ENCOUNTER FOR PREVENTIVE HEALTH EXAMINATION: Status: ACTIVE | Noted: 2017-08-03

## 2017-08-07 ENCOUNTER — APPOINTMENT (OUTPATIENT)
Dept: ORTHOPEDIC SURGERY | Facility: CLINIC | Age: 22
End: 2017-08-07

## 2017-08-11 ENCOUNTER — APPOINTMENT (OUTPATIENT)
Dept: ORTHOPEDIC SURGERY | Facility: CLINIC | Age: 22
End: 2017-08-11
Payer: OTHER MISCELLANEOUS

## 2017-08-11 VITALS
DIASTOLIC BLOOD PRESSURE: 78 MMHG | HEART RATE: 84 BPM | SYSTOLIC BLOOD PRESSURE: 116 MMHG | BODY MASS INDEX: 27.48 KG/M2 | HEIGHT: 60 IN | WEIGHT: 140 LBS

## 2017-08-11 DIAGNOSIS — Z78.9 OTHER SPECIFIED HEALTH STATUS: ICD-10-CM

## 2017-08-11 PROCEDURE — 72190 X-RAY EXAM OF PELVIS: CPT

## 2017-08-11 PROCEDURE — 99204 OFFICE O/P NEW MOD 45 MIN: CPT

## 2017-08-11 PROCEDURE — 72100 X-RAY EXAM L-S SPINE 2/3 VWS: CPT

## 2017-08-11 RX ORDER — IBUPROFEN 800 MG/1
800 TABLET, FILM COATED ORAL 3 TIMES DAILY
Qty: 90 | Refills: 0 | Status: ACTIVE | COMMUNITY
Start: 2017-08-11 | End: 1900-01-01

## 2017-08-22 ENCOUNTER — APPOINTMENT (OUTPATIENT)
Dept: ORTHOPEDIC SURGERY | Facility: CLINIC | Age: 22
End: 2017-08-22
Payer: OTHER MISCELLANEOUS

## 2017-08-22 VITALS — HEIGHT: 60 IN | BODY MASS INDEX: 27.48 KG/M2 | WEIGHT: 140 LBS

## 2017-08-22 DIAGNOSIS — M70.62 TROCHANTERIC BURSITIS, LEFT HIP: ICD-10-CM

## 2017-08-22 PROCEDURE — 99213 OFFICE O/P EST LOW 20 MIN: CPT

## 2017-09-21 ENCOUNTER — APPOINTMENT (OUTPATIENT)
Dept: ORTHOPEDIC SURGERY | Facility: CLINIC | Age: 22
End: 2017-09-21
Payer: OTHER MISCELLANEOUS

## 2017-09-21 DIAGNOSIS — M54.5 LOW BACK PAIN: ICD-10-CM

## 2017-09-21 DIAGNOSIS — S70.02XA CONTUSION OF LEFT HIP, INITIAL ENCOUNTER: ICD-10-CM

## 2017-09-21 DIAGNOSIS — S33.5XXA SPRAIN OF LIGAMENTS OF LUMBAR SPINE, INITIAL ENCOUNTER: ICD-10-CM

## 2017-09-21 PROCEDURE — 99214 OFFICE O/P EST MOD 30 MIN: CPT

## 2017-09-21 RX ORDER — DICLOFENAC SODIUM 75 MG/1
75 TABLET, DELAYED RELEASE ORAL
Qty: 60 | Refills: 0 | Status: ACTIVE | COMMUNITY
Start: 2017-09-21 | End: 1900-01-01

## 2018-03-01 ENCOUNTER — OUTPATIENT (OUTPATIENT)
Dept: OUTPATIENT SERVICES | Facility: HOSPITAL | Age: 23
LOS: 1 days | End: 2018-03-01
Payer: MEDICAID

## 2018-03-01 PROCEDURE — G9001: CPT

## 2018-03-13 ENCOUNTER — EMERGENCY (EMERGENCY)
Facility: HOSPITAL | Age: 23
LOS: 0 days | Discharge: HOME | End: 2018-03-13
Attending: EMERGENCY MEDICINE
Payer: COMMERCIAL

## 2018-03-13 VITALS
SYSTOLIC BLOOD PRESSURE: 115 MMHG | OXYGEN SATURATION: 99 % | WEIGHT: 138.01 LBS | HEIGHT: 60 IN | RESPIRATION RATE: 20 BRPM | TEMPERATURE: 98 F | DIASTOLIC BLOOD PRESSURE: 51 MMHG | HEART RATE: 76 BPM

## 2018-03-13 DIAGNOSIS — H91.92 UNSPECIFIED HEARING LOSS, LEFT EAR: ICD-10-CM

## 2018-03-13 DIAGNOSIS — Z79.1 LONG TERM (CURRENT) USE OF NON-STEROIDAL ANTI-INFLAMMATORIES (NSAID): ICD-10-CM

## 2018-03-13 DIAGNOSIS — H92.02 OTALGIA, LEFT EAR: ICD-10-CM

## 2018-03-13 DIAGNOSIS — J34.89 OTHER SPECIFIED DISORDERS OF NOSE AND NASAL SINUSES: ICD-10-CM

## 2018-03-13 DIAGNOSIS — R05 COUGH: ICD-10-CM

## 2018-03-13 DIAGNOSIS — J05.10 ACUTE EPIGLOTTITIS WITHOUT OBSTRUCTION: ICD-10-CM

## 2018-03-13 PROCEDURE — 99283 EMERGENCY DEPT VISIT LOW MDM: CPT | Mod: 25

## 2018-03-13 RX ORDER — PSEUDOEPHEDRINE HCL 30 MG
1 TABLET ORAL
Qty: 12 | Refills: 0 | OUTPATIENT
Start: 2018-03-13 | End: 2018-03-15

## 2018-03-13 NOTE — ED PROVIDER NOTE - OBJECTIVE STATEMENT
21 y/o female hx of epiglottis last year c/o left ear pain/decreased hearing in left ear since last night. States happens every where when gets sick. Has had some rhinorrhea, sneezing, mild cough. No fever/chills, no airway issues/difficulty swallowing/throat pain. Denies other symptoms     ROS: No fever/chills. No eye pain/changes in vision, No sore throat/dysphagia, No chest pain/palpitations. No SOB/cough/. No abdominal pain, N/V/D, no black/bloody bm. No dysuria/frequency/discharge, No headache. No Dizziness.    No rashes or breaks in skin. No numbness/tingling/weakness.

## 2018-03-13 NOTE — ED ADULT TRIAGE NOTE - CHIEF COMPLAINT QUOTE
"I can't hear out of my left ear" Reports having left ear pain starting last night, with difficulty hearing. denies fever chills. Reports having hx of epiglottitis.

## 2018-03-13 NOTE — ED ADULT NURSE NOTE - CAS TRG GENERAL NORM CIRC DET
No visible significant external bleeding/Capillary refill less/equal to 2 seconds/Strong peripheral pulses

## 2018-03-13 NOTE — ED PROVIDER NOTE - PHYSICAL EXAMINATION
Gen: No acute distress, non toxic  HEENT: Mucous membranes moist, pink conjunctivae, EOMI. nl light reflex b/l tm. slight effusion behind left ear decreased hearing on left. no mastoid ttp  CV: RRR, nl s1/s2.  Resp: CTAB, normal rate and effort  GI: Abdomen soft, NT, ND. No rebound, no guarding  : No CVAT  Neuro: A&O x 3, moving all 4 extremities  MSK: No spine or joint tenderness to palpation  Skin: No rashes. intact and perfused.

## 2018-03-13 NOTE — ED ADULT NURSE NOTE - OBJECTIVE STATEMENT
Reports having left ear pain starting yesterday, with difficulty hearing, reports having history of epiglotitis.

## 2018-03-14 DIAGNOSIS — R69 ILLNESS, UNSPECIFIED: ICD-10-CM

## 2018-08-29 ENCOUNTER — EMERGENCY (EMERGENCY)
Facility: HOSPITAL | Age: 23
LOS: 0 days | Discharge: HOME | End: 2018-08-29
Attending: EMERGENCY MEDICINE
Payer: COMMERCIAL

## 2018-08-29 VITALS
DIASTOLIC BLOOD PRESSURE: 80 MMHG | OXYGEN SATURATION: 99 % | SYSTOLIC BLOOD PRESSURE: 140 MMHG | TEMPERATURE: 98 F | RESPIRATION RATE: 20 BRPM | HEIGHT: 60 IN | WEIGHT: 134.04 LBS | HEART RATE: 68 BPM

## 2018-08-29 DIAGNOSIS — R11.2 NAUSEA WITH VOMITING, UNSPECIFIED: ICD-10-CM

## 2018-08-29 DIAGNOSIS — R10.13 EPIGASTRIC PAIN: ICD-10-CM

## 2018-08-29 DIAGNOSIS — Z79.1 LONG TERM (CURRENT) USE OF NON-STEROIDAL ANTI-INFLAMMATORIES (NSAID): ICD-10-CM

## 2018-08-29 PROBLEM — J05.10 ACUTE EPIGLOTTITIS WITHOUT OBSTRUCTION: Chronic | Status: ACTIVE | Noted: 2017-04-08

## 2018-08-29 PROCEDURE — 99283 EMERGENCY DEPT VISIT LOW MDM: CPT

## 2018-08-29 RX ORDER — ONDANSETRON 8 MG/1
1 TABLET, FILM COATED ORAL
Qty: 9 | Refills: 0 | OUTPATIENT
Start: 2018-08-29 | End: 2018-08-31

## 2018-08-29 RX ORDER — ONDANSETRON 8 MG/1
4 TABLET, FILM COATED ORAL ONCE
Qty: 0 | Refills: 0 | Status: COMPLETED | OUTPATIENT
Start: 2018-08-29 | End: 2018-08-29

## 2018-08-29 RX ORDER — IBUPROFEN 200 MG
600 TABLET ORAL ONCE
Qty: 0 | Refills: 0 | Status: COMPLETED | OUTPATIENT
Start: 2018-08-29 | End: 2018-08-29

## 2018-08-29 RX ORDER — IBUPROFEN 200 MG
1 TABLET ORAL
Qty: 20 | Refills: 0 | OUTPATIENT
Start: 2018-08-29 | End: 2018-09-02

## 2018-08-29 RX ADMIN — Medication 600 MILLIGRAM(S): at 10:47

## 2018-08-29 RX ADMIN — ONDANSETRON 4 MILLIGRAM(S): 8 TABLET, FILM COATED ORAL at 10:47

## 2018-08-29 NOTE — ED PROVIDER NOTE - MEDICAL DECISION MAKING DETAILS
24 yo F with nausea/vomiting, refusing labs  -zofran/motrin prn  -f/u with pcp outpt.   -pt. understands to return if symptoms worsen or don't improve

## 2018-08-29 NOTE — ED PROVIDER NOTE - PHYSICAL EXAMINATION
Vitals: WNL  Gen: AAOx3, NAD, sitting comfortably in stretcher, non-toxic, pleasant demeanor   Head: ncat, perrla, eomi b/l  Neck: supple, no lymphadenopathy, no midline deviation  Heart: rrr, no m/r/g  Lungs: CTA b/l, no rales/ronchi/wheezes  Abd: soft, nontender, non-distended, no rebound or guarding  Ext: no clubbing/cyanosis/edema  Neuro: sensation and muscle strength intact b/l, steady gait

## 2018-08-29 NOTE — ED PROVIDER NOTE - OBJECTIVE STATEMENT
24 yo F with epigastric cramping, n/v, vomited once this morning while at work.  She still feels nauseas.  She doesn't want labs draw or urine sent, she doesn't think she's pregnant.  She picked up a cold and wants to go home and rest.  No other complaints.   ROS: negative for fever, cough, headache, chest pain, shortness of breath, abd pain, nausea, vomiting, diarrhea, rash, paresthesia, and weakness--all other systems reviewed are negative.   PMH: negative; Meds: Denies; SH: Denies smoking/drinking/drug use

## 2019-02-24 ENCOUNTER — EMERGENCY (EMERGENCY)
Facility: HOSPITAL | Age: 24
LOS: 0 days | Discharge: HOME | End: 2019-02-24
Attending: EMERGENCY MEDICINE
Payer: COMMERCIAL

## 2019-02-24 VITALS
WEIGHT: 130.07 LBS | TEMPERATURE: 98 F | HEIGHT: 60 IN | OXYGEN SATURATION: 99 % | DIASTOLIC BLOOD PRESSURE: 70 MMHG | RESPIRATION RATE: 17 BRPM | SYSTOLIC BLOOD PRESSURE: 122 MMHG | HEART RATE: 74 BPM
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
## 2019-02-24 DIAGNOSIS — S43.401A UNSPECIFIED SPRAIN OF RIGHT SHOULDER JOINT, INITIAL ENCOUNTER: ICD-10-CM

## 2019-02-24 DIAGNOSIS — Z79.1 LONG TERM (CURRENT) USE OF NON-STEROIDAL ANTI-INFLAMMATORIES (NSAID): ICD-10-CM

## 2019-02-24 DIAGNOSIS — R07.1 CHEST PAIN ON BREATHING: ICD-10-CM

## 2019-02-24 DIAGNOSIS — J05.10 ACUTE EPIGLOTTITIS WITHOUT OBSTRUCTION: ICD-10-CM
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
## 2019-02-24 DIAGNOSIS — M25.511 PAIN IN RIGHT SHOULDER: ICD-10-CM

## 2019-02-24 PROCEDURE — 71045 X-RAY EXAM CHEST 1 VIEW: CPT | Mod: 26

## 2019-02-24 PROCEDURE — 99283 EMERGENCY DEPT VISIT LOW MDM: CPT | Mod: 25

## 2019-02-24 PROCEDURE — 73030 X-RAY EXAM OF SHOULDER: CPT | Mod: 26,RT

## 2019-02-24 RX ORDER — IBUPROFEN 200 MG
1 TABLET ORAL
Qty: 20 | Refills: 0 | OUTPATIENT
Start: 2019-02-24 | End: 2019-02-28

## 2019-02-24 RX ORDER — KETOROLAC TROMETHAMINE 30 MG/ML
60 SYRINGE (ML) INJECTION ONCE
Qty: 0 | Refills: 0 | Status: DISCONTINUED | OUTPATIENT
Start: 2019-02-24 | End: 2019-02-24

## 2019-02-24 RX ORDER — METHOCARBAMOL 500 MG/1
2 TABLET, FILM COATED ORAL
Qty: 30 | Refills: 0 | OUTPATIENT
Start: 2019-02-24 | End: 2019-02-28

## 2019-02-24 RX ADMIN — Medication 60 MILLIGRAM(S): at 08:42

## 2019-02-24 NOTE — ED ADULT NURSE NOTE - OBJECTIVE STATEMENT
Pt is a 23YOF who hurt her right shoulder while at work. 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

## 2019-02-24 NOTE — ED PROVIDER NOTE - CLINICAL SUMMARY MEDICAL DECISION MAKING FREE TEXT BOX
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

## 2019-02-24 NOTE — ED PROVIDER NOTE - MUSCULOSKELETAL MINIMAL EXAM
R sided thoracic - shoulder stiffness of muscle bed otherwise no midline spinal tenderness, no chest wall tenderness.

## 2019-02-24 NOTE — ED PROVIDER NOTE - OBJECTIVE STATEMENT
23 year old female presenting to ED due to R sided upper shoulder pain - stiffness - states after she was working felt some stiffness on R shoulder then continued to work yesterday - today noted continued pain and stiffness to R shoulder, no trauma - states otherwise no SOB but pleuritic upper back pain noted with stiffness.

## 2019-02-24 NOTE — ED ADULT NURSE REASSESSMENT NOTE - NS ED NURSE REASSESS COMMENT FT1
Pt able to ambulate safely and steadily w/out assistance, denies dizziness/weakness upon standing, pt d/c'd home w/ follow up. Pt education deemed successful at time of discharge after patient education and teach back proves proficiency. Pt has no distress at time of discharge, pt provided discharge instructions, follow up care and results reviewed by MD. Reinforced by the RN at time of discharge.

## 2019-02-24 NOTE — ED ADULT TRIAGE NOTE - CHIEF COMPLAINT QUOTE
pt states " as of yesterday I began having a sharp pain in my upper right shoulder area that gets worst whenever I breathe."

## 2019-03-25 ENCOUNTER — APPOINTMENT (OUTPATIENT)
Dept: ORTHOPEDIC SURGERY | Facility: CLINIC | Age: 24
End: 2019-03-25
Payer: OTHER MISCELLANEOUS

## 2019-03-25 VITALS
SYSTOLIC BLOOD PRESSURE: 104 MMHG | WEIGHT: 128 LBS | HEART RATE: 75 BPM | HEIGHT: 60 IN | BODY MASS INDEX: 25.13 KG/M2 | DIASTOLIC BLOOD PRESSURE: 64 MMHG

## 2019-03-25 PROCEDURE — 99214 OFFICE O/P EST MOD 30 MIN: CPT

## 2019-03-25 PROCEDURE — 72040 X-RAY EXAM NECK SPINE 2-3 VW: CPT

## 2019-03-25 RX ORDER — DICLOFENAC SODIUM 75 MG/1
75 TABLET, DELAYED RELEASE ORAL
Qty: 60 | Refills: 0 | Status: ACTIVE | COMMUNITY
Start: 2019-03-25 | End: 1900-01-01

## 2019-03-25 NOTE — HISTORY OF PRESENT ILLNESS
[de-identified] : She injured her back at work on February 23 and is having right sided upper back and neck pain radiating to the shoulder. I had previously treated HER-2 years ago after a fall at work for diffuse back pains that eventually resolved on diclofenac after not responding to ibuprofen. His current neck and upper back pain can radiate at times to the biceps area. There have been no changes in her gait or balance. She has not had associated neurologic symptoms. There is no increase in the pain with coughing, sneezing or forcing to move her bowels. She has some discomfort sleeping. She has been taking ibuprofen 400 mg every other day. [Pain Location] : pain [Stable] : stable [7] : a current pain level of 7/10

## 2019-03-25 NOTE — REASON FOR VISIT
[Initial Visit] : an initial visit for [Worker's Compensation] : this visit is related to worker's compensation [Neck Pain] : neck pain [FreeTextEntry2] : Neck pain

## 2019-03-25 NOTE — DISCUSSION/SUMMARY
[Medication Risks Reviewed] : Medication risks reviewed [de-identified] : I have recommended moist teeth and discussed some daily activities she needs to avoid which may aggravate the symptoms. She is to start on diclofenac 75 mg twice a day with instructions to take it for 4 or 5 days after her symptoms have fully resolved. I will see her for followup in 3-4 weeks if she has any residual symptoms. She has been given clearance to return to work.

## 2019-03-25 NOTE — PHYSICAL EXAM
[de-identified] : She is fully alert and oriented with a normal mood and affect. She is in no acute distress. She ambulates with a normal gait including tiptoe and heel walking. There is no evidence of unsteadiness or spasticity. There are no cutaneous abnormalities were palpable bony defects of the spine. There is no evidence of shortness of breath or respiratory distress. There is no paravertebral muscle spasm. Her lower extremity neurological examination revealed 1-2+ symmetrical reflexes with normal motor power and sensation. Straight leg raising is negative to 90°. Her knees have a full range of motion with normal stability. Vascular exam shows no evidence of varicosities and there is no lymphedema. There are no cutaneous abnormalities of the upper or lower extremities. Shoulder range of motion is full and symmetrical with some discomfort at the extremes on the right. Her upper extremity neurological examination revealed 1-2+ symmetrical he flexes with normal motor power and sensation. Range of motion of the cervical spine is normal. She has flexion with the chin region to within 2 fingerbreadths of the chest. Extension is to 90° with rotation of 80° bilaterally and tilt are 40° bilaterally. [de-identified] : AP and lateral x-rays of the cervical spine reveals straightening of the normal cervical lordosis. Sagittal alignment is otherwise normal. There are no destructive changes and there are minimal degenerative changes. [Fit For Work] : fit for work [FreeTextEntry1] : Temporarily Avoid any heavy lifting.

## 2019-11-11 NOTE — ED PROVIDER NOTE - PHYSICAL EXAMINATION
Oriented - self; Oriented - place; Oriented - time
full power to UE   + lateral neck tendernes and spasm  sensation and pulses intact
No

## 2021-03-10 ENCOUNTER — TRANSCRIPTION ENCOUNTER (OUTPATIENT)
Age: 26
End: 2021-03-10

## 2021-03-11 NOTE — H&P ADULT. - MARITAL STATUS
Single Hydroxychloroquine Pregnancy And Lactation Text: This medication has been shown to cause fetal harm but it isn't assigned a Pregnancy Risk Category. There are small amounts excreted in breast milk.

## 2021-04-29 NOTE — ED ADULT NURSE NOTE - CAS EDN DISCHARGE ASSESSMENT
-- DO NOT REPLY / DO NOT REPLY ALL --  -- Message is from the [facility]--      Patient is requesting a medication refill - medication is on active list    Was Medication Pended? Yes.    Rx Name and Dose:  methotrexate 2.5 MG Tab    Duration: 90 days    Pharmacy  [id number] - [place]  [address] [facility]    Patient confirmed the above pharmacy as correct?  Yes    Caller Information       Type Contact Phone    04/29/2021 02:52 PM CDT Phone (Incoming) [id number] - [place] - [address] AT [facility] (Pharmacy) [phone]     Veronica          Alternative phone number: None    Turnaround time given to caller:   \"This message will be sent to [state Provider's name]. The clinical team will fulfill your request as soon as they review your message.\"   Alert and oriented to person, place and time

## 2021-10-15 ENCOUNTER — TRANSCRIPTION ENCOUNTER (OUTPATIENT)
Age: 26
End: 2021-10-15

## 2022-10-26 ENCOUNTER — NON-APPOINTMENT (OUTPATIENT)
Age: 27
End: 2022-10-26

## 2023-05-25 ENCOUNTER — APPOINTMENT (OUTPATIENT)
Dept: ORTHOPEDIC SURGERY | Facility: CLINIC | Age: 28
End: 2023-05-25
Payer: OTHER MISCELLANEOUS

## 2023-05-25 VITALS
WEIGHT: 140 LBS | SYSTOLIC BLOOD PRESSURE: 119 MMHG | HEART RATE: 76 BPM | HEIGHT: 60 IN | DIASTOLIC BLOOD PRESSURE: 77 MMHG | BODY MASS INDEX: 27.48 KG/M2

## 2023-05-25 DIAGNOSIS — M54.9 DORSALGIA, UNSPECIFIED: ICD-10-CM

## 2023-05-25 DIAGNOSIS — M25.511 PAIN IN RIGHT SHOULDER: ICD-10-CM

## 2023-05-25 DIAGNOSIS — S13.9XXA SPRAIN OF JOINTS AND LIGAMENTS OF UNSPECIFIED PARTS OF NECK, INITIAL ENCOUNTER: ICD-10-CM

## 2023-05-25 DIAGNOSIS — S43.401A UNSPECIFIED SPRAIN OF RIGHT SHOULDER JOINT, INITIAL ENCOUNTER: ICD-10-CM

## 2023-05-25 DIAGNOSIS — M54.2 CERVICALGIA: ICD-10-CM

## 2023-05-25 PROCEDURE — 99203 OFFICE O/P NEW LOW 30 MIN: CPT

## 2023-05-25 PROCEDURE — 72040 X-RAY EXAM NECK SPINE 2-3 VW: CPT

## 2023-05-25 PROCEDURE — 73030 X-RAY EXAM OF SHOULDER: CPT | Mod: RT

## 2023-05-25 RX ORDER — DICLOFENAC SODIUM 75 MG/1
75 TABLET, DELAYED RELEASE ORAL
Qty: 60 | Refills: 0 | Status: ACTIVE | COMMUNITY
Start: 2023-05-25 | End: 1900-01-01

## 2023-05-26 NOTE — PHYSICAL EXAM
[de-identified] : She is fully alert and oriented with a normal mood and affect.  She ambulates with a normal gait.  Her lower extremity neurological examination revealed 1+ symmetrical reflexes.  There is no clonus.  Motor power is normal to manual testing all lower extremity groups and straight leg raising is negative to 90 degrees.  Her upper extremity neurological examination also revealed 1+ symmetrical reflexes with normal motor power in all upper extremity groups but with pain in the right shoulder on testing and with normal sensation.  Range of motion of the cervical spine is full and painless.  There is no localized tenderness about the shoulder but there is pain on passive range of motion. [de-identified] : In light of her complaints AP and lateral x-rays of the cervical spine and 2 views of the right shoulder are obtained.  There is mild loss of the normal cervical lordosis.  Disc heights are well-maintained and sagittal alignment is normal.\par \par X-rays of the shoulders show no bony abnormality.

## 2023-05-26 NOTE — HISTORY OF PRESENT ILLNESS
[de-identified] : I had previously seen this patient 6 years ago after a fall at work with lower back pain and leg pain and then 4 years ago after another injury at work with right upper back and right shoulder pain.  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  The pain does not radiate down her arm and she has not had associated neurologic symptoms.  She tells me that she feels her shoulder is going out.  She has been taking ibuprofen 400 mg twice a day. [Pain Location] : pain [Worsening] : worsening [9] : a maximum pain level of 9/10

## 2023-05-26 NOTE — DISCUSSION/SUMMARY
[Medication Risks Reviewed] : Medication risks reviewed [de-identified] : The symptoms are really more related to her right shoulder.  This injury occurred over 3 weeks ago.  She has been started on diclofenac 75 mg twice a day.  She will call if there are problems with the medication.  If her symptoms have not seen any significant improvement in 10 days she will see one of our shoulder surgeons.

## 2023-05-26 NOTE — REASON FOR VISIT
[Initial Visit] : an initial visit for [Back Pain] : back pain [FreeTextEntry2] : Right upper back and right shoulder pain

## 2023-10-17 NOTE — ED ADULT TRIAGE NOTE - ESI TRIAGE ACUITY LEVEL, MLM
The site was marked. Prepped: right groin and right radial. Prepped with: ChloraPrep. The site was clipped. The patient was draped. 5

## 2023-11-21 ENCOUNTER — EMERGENCY (EMERGENCY)
Facility: HOSPITAL | Age: 28
LOS: 1 days | Discharge: HOME | End: 2023-11-21
Attending: EMERGENCY MEDICINE
Payer: MEDICAID

## 2023-11-21 VITALS
TEMPERATURE: 98 F | HEART RATE: 78 BPM | SYSTOLIC BLOOD PRESSURE: 124 MMHG | DIASTOLIC BLOOD PRESSURE: 88 MMHG | RESPIRATION RATE: 18 BRPM | OXYGEN SATURATION: 100 %

## 2023-11-21 VITALS
DIASTOLIC BLOOD PRESSURE: 86 MMHG | OXYGEN SATURATION: 99 % | TEMPERATURE: 98 F | HEART RATE: 84 BPM | SYSTOLIC BLOOD PRESSURE: 127 MMHG | RESPIRATION RATE: 17 BRPM

## 2023-11-21 LAB
ALBUMIN SERPL ELPH-MCNC: 4.7 G/DL — SIGNIFICANT CHANGE UP (ref 3.3–5)
ALBUMIN SERPL ELPH-MCNC: 4.7 G/DL — SIGNIFICANT CHANGE UP (ref 3.3–5)
ALP SERPL-CCNC: 59 U/L — SIGNIFICANT CHANGE UP (ref 40–120)
ALP SERPL-CCNC: 59 U/L — SIGNIFICANT CHANGE UP (ref 40–120)
ALT FLD-CCNC: 14 U/L — SIGNIFICANT CHANGE UP (ref 10–45)
ALT FLD-CCNC: 14 U/L — SIGNIFICANT CHANGE UP (ref 10–45)
ANION GAP SERPL CALC-SCNC: 12 MMOL/L — SIGNIFICANT CHANGE UP (ref 5–17)
ANION GAP SERPL CALC-SCNC: 12 MMOL/L — SIGNIFICANT CHANGE UP (ref 5–17)
AST SERPL-CCNC: 13 U/L — SIGNIFICANT CHANGE UP (ref 10–40)
AST SERPL-CCNC: 13 U/L — SIGNIFICANT CHANGE UP (ref 10–40)
BASOPHILS # BLD AUTO: 0.03 K/UL — SIGNIFICANT CHANGE UP (ref 0–0.2)
BASOPHILS # BLD AUTO: 0.03 K/UL — SIGNIFICANT CHANGE UP (ref 0–0.2)
BASOPHILS NFR BLD AUTO: 0.4 % — SIGNIFICANT CHANGE UP (ref 0–2)
BASOPHILS NFR BLD AUTO: 0.4 % — SIGNIFICANT CHANGE UP (ref 0–2)
BILIRUB SERPL-MCNC: 0.2 MG/DL — SIGNIFICANT CHANGE UP (ref 0.2–1.2)
BILIRUB SERPL-MCNC: 0.2 MG/DL — SIGNIFICANT CHANGE UP (ref 0.2–1.2)
BUN SERPL-MCNC: 15 MG/DL — SIGNIFICANT CHANGE UP (ref 7–23)
BUN SERPL-MCNC: 15 MG/DL — SIGNIFICANT CHANGE UP (ref 7–23)
CALCIUM SERPL-MCNC: 9.4 MG/DL — SIGNIFICANT CHANGE UP (ref 8.4–10.5)
CALCIUM SERPL-MCNC: 9.4 MG/DL — SIGNIFICANT CHANGE UP (ref 8.4–10.5)
CHLORIDE SERPL-SCNC: 104 MMOL/L — SIGNIFICANT CHANGE UP (ref 96–108)
CHLORIDE SERPL-SCNC: 104 MMOL/L — SIGNIFICANT CHANGE UP (ref 96–108)
CO2 SERPL-SCNC: 22 MMOL/L — SIGNIFICANT CHANGE UP (ref 22–31)
CO2 SERPL-SCNC: 22 MMOL/L — SIGNIFICANT CHANGE UP (ref 22–31)
CREAT SERPL-MCNC: 0.76 MG/DL — SIGNIFICANT CHANGE UP (ref 0.5–1.3)
CREAT SERPL-MCNC: 0.76 MG/DL — SIGNIFICANT CHANGE UP (ref 0.5–1.3)
D DIMER BLD IA.RAPID-MCNC: <150 NG/ML DDU — SIGNIFICANT CHANGE UP
D DIMER BLD IA.RAPID-MCNC: <150 NG/ML DDU — SIGNIFICANT CHANGE UP
EGFR: 109 ML/MIN/1.73M2 — SIGNIFICANT CHANGE UP
EGFR: 109 ML/MIN/1.73M2 — SIGNIFICANT CHANGE UP
EOSINOPHIL # BLD AUTO: 0.04 K/UL — SIGNIFICANT CHANGE UP (ref 0–0.5)
EOSINOPHIL # BLD AUTO: 0.04 K/UL — SIGNIFICANT CHANGE UP (ref 0–0.5)
EOSINOPHIL NFR BLD AUTO: 0.6 % — SIGNIFICANT CHANGE UP (ref 0–6)
EOSINOPHIL NFR BLD AUTO: 0.6 % — SIGNIFICANT CHANGE UP (ref 0–6)
GLUCOSE SERPL-MCNC: 97 MG/DL — SIGNIFICANT CHANGE UP (ref 70–99)
GLUCOSE SERPL-MCNC: 97 MG/DL — SIGNIFICANT CHANGE UP (ref 70–99)
HCG SERPL-ACNC: <2 MIU/ML — SIGNIFICANT CHANGE UP
HCG SERPL-ACNC: <2 MIU/ML — SIGNIFICANT CHANGE UP
HCT VFR BLD CALC: 37.5 % — SIGNIFICANT CHANGE UP (ref 34.5–45)
HCT VFR BLD CALC: 37.5 % — SIGNIFICANT CHANGE UP (ref 34.5–45)
HGB BLD-MCNC: 12.4 G/DL — SIGNIFICANT CHANGE UP (ref 11.5–15.5)
HGB BLD-MCNC: 12.4 G/DL — SIGNIFICANT CHANGE UP (ref 11.5–15.5)
IMM GRANULOCYTES NFR BLD AUTO: 0.3 % — SIGNIFICANT CHANGE UP (ref 0–0.9)
IMM GRANULOCYTES NFR BLD AUTO: 0.3 % — SIGNIFICANT CHANGE UP (ref 0–0.9)
LYMPHOCYTES # BLD AUTO: 2.17 K/UL — SIGNIFICANT CHANGE UP (ref 1–3.3)
LYMPHOCYTES # BLD AUTO: 2.17 K/UL — SIGNIFICANT CHANGE UP (ref 1–3.3)
LYMPHOCYTES # BLD AUTO: 31.8 % — SIGNIFICANT CHANGE UP (ref 13–44)
LYMPHOCYTES # BLD AUTO: 31.8 % — SIGNIFICANT CHANGE UP (ref 13–44)
MCHC RBC-ENTMCNC: 28.6 PG — SIGNIFICANT CHANGE UP (ref 27–34)
MCHC RBC-ENTMCNC: 28.6 PG — SIGNIFICANT CHANGE UP (ref 27–34)
MCHC RBC-ENTMCNC: 33.1 GM/DL — SIGNIFICANT CHANGE UP (ref 32–36)
MCHC RBC-ENTMCNC: 33.1 GM/DL — SIGNIFICANT CHANGE UP (ref 32–36)
MCV RBC AUTO: 86.6 FL — SIGNIFICANT CHANGE UP (ref 80–100)
MCV RBC AUTO: 86.6 FL — SIGNIFICANT CHANGE UP (ref 80–100)
MONOCYTES # BLD AUTO: 0.46 K/UL — SIGNIFICANT CHANGE UP (ref 0–0.9)
MONOCYTES # BLD AUTO: 0.46 K/UL — SIGNIFICANT CHANGE UP (ref 0–0.9)
MONOCYTES NFR BLD AUTO: 6.7 % — SIGNIFICANT CHANGE UP (ref 2–14)
MONOCYTES NFR BLD AUTO: 6.7 % — SIGNIFICANT CHANGE UP (ref 2–14)
NEUTROPHILS # BLD AUTO: 4.11 K/UL — SIGNIFICANT CHANGE UP (ref 1.8–7.4)
NEUTROPHILS # BLD AUTO: 4.11 K/UL — SIGNIFICANT CHANGE UP (ref 1.8–7.4)
NEUTROPHILS NFR BLD AUTO: 60.2 % — SIGNIFICANT CHANGE UP (ref 43–77)
NEUTROPHILS NFR BLD AUTO: 60.2 % — SIGNIFICANT CHANGE UP (ref 43–77)
NRBC # BLD: 0 /100 WBCS — SIGNIFICANT CHANGE UP (ref 0–0)
NRBC # BLD: 0 /100 WBCS — SIGNIFICANT CHANGE UP (ref 0–0)
NT-PROBNP SERPL-SCNC: <36 PG/ML — SIGNIFICANT CHANGE UP (ref 0–300)
NT-PROBNP SERPL-SCNC: <36 PG/ML — SIGNIFICANT CHANGE UP (ref 0–300)
PLATELET # BLD AUTO: 252 K/UL — SIGNIFICANT CHANGE UP (ref 150–400)
PLATELET # BLD AUTO: 252 K/UL — SIGNIFICANT CHANGE UP (ref 150–400)
POTASSIUM SERPL-MCNC: 4 MMOL/L — SIGNIFICANT CHANGE UP (ref 3.5–5.3)
POTASSIUM SERPL-MCNC: 4 MMOL/L — SIGNIFICANT CHANGE UP (ref 3.5–5.3)
POTASSIUM SERPL-SCNC: 4 MMOL/L — SIGNIFICANT CHANGE UP (ref 3.5–5.3)
POTASSIUM SERPL-SCNC: 4 MMOL/L — SIGNIFICANT CHANGE UP (ref 3.5–5.3)
PROT SERPL-MCNC: 7.3 G/DL — SIGNIFICANT CHANGE UP (ref 6–8.3)
PROT SERPL-MCNC: 7.3 G/DL — SIGNIFICANT CHANGE UP (ref 6–8.3)
RBC # BLD: 4.33 M/UL — SIGNIFICANT CHANGE UP (ref 3.8–5.2)
RBC # BLD: 4.33 M/UL — SIGNIFICANT CHANGE UP (ref 3.8–5.2)
RBC # FLD: 12.6 % — SIGNIFICANT CHANGE UP (ref 10.3–14.5)
RBC # FLD: 12.6 % — SIGNIFICANT CHANGE UP (ref 10.3–14.5)
SODIUM SERPL-SCNC: 138 MMOL/L — SIGNIFICANT CHANGE UP (ref 135–145)
SODIUM SERPL-SCNC: 138 MMOL/L — SIGNIFICANT CHANGE UP (ref 135–145)
TROPONIN T, HIGH SENSITIVITY RESULT: <6 NG/L — SIGNIFICANT CHANGE UP (ref 0–51)
TROPONIN T, HIGH SENSITIVITY RESULT: <6 NG/L — SIGNIFICANT CHANGE UP (ref 0–51)
WBC # BLD: 6.83 K/UL — SIGNIFICANT CHANGE UP (ref 3.8–10.5)
WBC # BLD: 6.83 K/UL — SIGNIFICANT CHANGE UP (ref 3.8–10.5)
WBC # FLD AUTO: 6.83 K/UL — SIGNIFICANT CHANGE UP (ref 3.8–10.5)
WBC # FLD AUTO: 6.83 K/UL — SIGNIFICANT CHANGE UP (ref 3.8–10.5)

## 2023-11-21 PROCEDURE — 71045 X-RAY EXAM CHEST 1 VIEW: CPT

## 2023-11-21 PROCEDURE — 99285 EMERGENCY DEPT VISIT HI MDM: CPT

## 2023-11-21 PROCEDURE — 80053 COMPREHEN METABOLIC PANEL: CPT

## 2023-11-21 PROCEDURE — 84484 ASSAY OF TROPONIN QUANT: CPT

## 2023-11-21 PROCEDURE — 85025 COMPLETE CBC W/AUTO DIFF WBC: CPT

## 2023-11-21 PROCEDURE — 83880 ASSAY OF NATRIURETIC PEPTIDE: CPT

## 2023-11-21 PROCEDURE — 85379 FIBRIN DEGRADATION QUANT: CPT

## 2023-11-21 PROCEDURE — 96374 THER/PROPH/DIAG INJ IV PUSH: CPT

## 2023-11-21 PROCEDURE — 93005 ELECTROCARDIOGRAM TRACING: CPT

## 2023-11-21 PROCEDURE — 84702 CHORIONIC GONADOTROPIN TEST: CPT

## 2023-11-21 PROCEDURE — 71045 X-RAY EXAM CHEST 1 VIEW: CPT | Mod: 26

## 2023-11-21 PROCEDURE — 99285 EMERGENCY DEPT VISIT HI MDM: CPT | Mod: 25

## 2023-11-21 RX ORDER — KETOROLAC TROMETHAMINE 30 MG/ML
15 SYRINGE (ML) INJECTION ONCE
Refills: 0 | Status: DISCONTINUED | OUTPATIENT
Start: 2023-11-21 | End: 2023-11-21

## 2023-11-21 RX ADMIN — Medication 15 MILLIGRAM(S): at 16:20

## 2023-11-21 RX ADMIN — Medication 15 MILLIGRAM(S): at 17:52

## 2023-11-21 NOTE — ED ADULT NURSE NOTE - DISCHARGE DATE/TIME
Dear Ms. Leone,  It was nice to see you.  I'm sorry you had another fall.  As we talked, your labs EKG and CT scans of neck and brain don't show any significant injuries or cause for your frequent falls.      However, falls can become more frequent as we age and our core muscles weaken and balance suffers.      Removing obstacles and transitions at home can decrease falls.      Doing exercises to increase strength and balance can help avoid falls.      Follow up in clinic to discuss home evaluation and options for PT.    Follow up in clinic as needed for any persistent or new pains.    Dr. Abad Sosa      
21-Nov-2023 18:33

## 2023-11-21 NOTE — ED PROVIDER NOTE - PATIENT PORTAL LINK FT
You can access the FollowMyHealth Patient Portal offered by [facility] by registering at the following website: [email or web address]. By joining [employer]’s FollowMyHealth portal, you will also be able to view your health information using other applications (apps) compatible with our system. You can access the FollowMyHealth Patient Portal offered by [facility] by registering at the following website: [email or web address]. By joining [employer]’s FollowMyHealth portal, you will also be able to view your health information using other applications (apps) compatible with our system. You can access the FollowMyHealth Patient Portal offered by [facility] by registering at the following website: [email or web address]. By joining [employer]’s FollowMyHealth portal, you will also be able to view your health information using other applications (apps) compatible with our system.

## 2023-11-21 NOTE — ED PROVIDER NOTE - OBJECTIVE STATEMENT
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 She reports pain is in the right upper chest, non-radiating, sharp in nature exacerbated with deep inspiration and talking. She also notes that she has had mild swelling to the right outside thigh for approx 6 months not associated with pain. She has never had similar in the past. She denies fevers, chills, sob, cough, abd pain, n/v, change in exercise, smoking, cocaine use, family h/o early CAD.

## 2023-11-21 NOTE — ED PROVIDER NOTE - NS ED ATTENDING STATEMENT MOD
This was a shared visit with the JULIO. I reviewed and verified the documentation and independently performed the documented:

## 2023-11-21 NOTE — ED PROVIDER NOTE - PHYSICAL EXAMINATION
CONSTITUTIONAL: Patient is awake, alert and oriented x 3. Patient is well appearing and in no acute distress  HEAD: NCAT  NECK: supple, FROM  LUNGS: CTA b/l, no wheezing or rales. + Reproducible R anterior upper chest wall tenderness    HEART: RRR.+S1S2 no murmurs  ABDOMEN: Soft, non-distended, nttp,  no rebound or guarding  EXTREMITY: no edema or calf tenderness b/l, FROM upper and lower ext b/l  SKIN: with no rash or lesions  NEURO: No focal deficits

## 2023-11-21 NOTE — ED PROVIDER NOTE - PROGRESS NOTE DETAILS
Patient work-up in the emergency department acute concerning findings.  Discussed results with patient.  Likely pain is MSK in nature.  Will discharge home.  Roberta Yang PA-C

## 2023-11-21 NOTE — ED PROVIDER NOTE - NSFOLLOWUPINSTRUCTIONS_ED_ALL_ED_FT
1. Please follow up with your Primary Care Doctor after discharge, bring a copy of your results to follow up appointment     2. Please rest, stay hydrated and continue all at home medications as previously prescribed    3. For continued or recurrent pain recommend taking over the counter Tylenol (acetaminophen) 1000 mg every 6-8 hours as needed and/or over the counter Motrin (Ibuprofen/Advil) 600mg every 6 hours as needed    4. Return to ED for any new or worsened symptoms of concern

## 2023-11-21 NOTE — ED PROVIDER NOTE - ATTENDING APP SHARED VISIT CONTRIBUTION OF CARE
Attending MD Grant:   I personally have seen and examined this patient.  Physician assistant note reviewed and agree on plan of care and except where noted.  See below for details.     Seen in [facility]    28F with no reported contributory PMH/PSH/Meds, no known drug allergies presents to the ED with chest pain.  Reports R upper chest pain started earlier today at work, non-radiating, sharp.  Reports exacerbated deep inspiration and talking.  Denies leg swelling, but reports has had ~6 mo R lateral thigh mild swelling. Denies hemoptysis, recent surgery or trauma, prior PE or DVT, hormone use, history of malignancy. Denies shortness of breath, fevers, cough, URI symptoms, abdominal pain, nausea, vomiting, diarrhea, urinary complaints. Denies drug use, tobacco.  Denies Fhx of cardiac disease.    Exam:   General: NAD  HENT: head NCAT, airway patent  Eyes: anicteric, no conjunctival injection   Lungs: lungs CTAB with good inspiratory effort, no wheezing, no rhonchi, no rales  Chest: +S1S2, no obvious m/r/g, +R anterior upper chest tenderness to palpation with no ecchymosis or rash at same area, no edema noted by this MD at area of R lateral thigh, no rash or ecchymosis noted at R lateral thigh, no erythema, no fluctuance or induration at R lateral thigh area  GI: abdomen soft with +BS, NT, ND  MSK: ranging neck and extremities freely, no calf tenderness, swelling, erythema or warmth,   Neuro: moving all extremities spontaneously, nonfocal  Psych: normal mood and affect     A/P: 28F with chest pain, will evaluate for but not limited to MSK chest pain, lower suspicion for ACS will obtain trop, EKG, CXR, lower suspicion for PE will obtain DVT, will give analgesia and keep on cardiac monitor

## 2023-11-21 NOTE — ED ADULT NURSE NOTE - OBJECTIVE STATEMENT
28 yr old female amb to ED with c/o chest pain upon waking up this morning . Was fine yesterday Denies inj or trauma to area Denies sob. No obvious inj worsening pain upon touch. Denies fever or chills. Denies cigarette smoking , Birth control or recent travel. Works as an RN.

## 2023-11-27 ENCOUNTER — APPOINTMENT (OUTPATIENT)
Dept: INTERNAL MEDICINE | Facility: CLINIC | Age: 28
End: 2023-11-27

## 2023-12-04 ENCOUNTER — APPOINTMENT (OUTPATIENT)
Dept: INTERNAL MEDICINE | Facility: CLINIC | Age: 28
End: 2023-12-04
Payer: MEDICAID

## 2023-12-04 VITALS
SYSTOLIC BLOOD PRESSURE: 136 MMHG | DIASTOLIC BLOOD PRESSURE: 80 MMHG | TEMPERATURE: 98.1 F | OXYGEN SATURATION: 100 % | BODY MASS INDEX: 27.48 KG/M2 | WEIGHT: 140 LBS | HEART RATE: 91 BPM | HEIGHT: 60 IN

## 2023-12-04 DIAGNOSIS — M79.659 PAIN IN UNSPECIFIED THIGH: ICD-10-CM

## 2023-12-04 DIAGNOSIS — E78.00 PURE HYPERCHOLESTEROLEMIA, UNSPECIFIED: ICD-10-CM

## 2023-12-04 DIAGNOSIS — N92.0 EXCESSIVE AND FREQUENT MENSTRUATION WITH REGULAR CYCLE: ICD-10-CM

## 2023-12-04 DIAGNOSIS — Z11.1 ENCOUNTER FOR SCREENING FOR RESPIRATORY TUBERCULOSIS: ICD-10-CM

## 2023-12-04 PROCEDURE — 99205 OFFICE O/P NEW HI 60 MIN: CPT | Mod: 25

## 2023-12-06 ENCOUNTER — NON-APPOINTMENT (OUTPATIENT)
Age: 28
End: 2023-12-06

## 2023-12-07 ENCOUNTER — APPOINTMENT (OUTPATIENT)
Dept: ULTRASOUND IMAGING | Facility: CLINIC | Age: 28
End: 2023-12-07
Payer: MEDICAID

## 2023-12-07 LAB
ALBUMIN SERPL ELPH-MCNC: 4.7 G/DL
ALP BLD-CCNC: 63 U/L
ALT SERPL-CCNC: 8 U/L
ANION GAP SERPL CALC-SCNC: 10 MMOL/L
AST SERPL-CCNC: 11 U/L
BASOPHILS # BLD AUTO: 0.04 K/UL
BASOPHILS NFR BLD AUTO: 0.8 %
BILIRUB SERPL-MCNC: <0.2 MG/DL
BUN SERPL-MCNC: 11 MG/DL
CALCIUM SERPL-MCNC: 9.2 MG/DL
CHLORIDE SERPL-SCNC: 106 MMOL/L
CHOLEST SERPL-MCNC: 228 MG/DL
CO2 SERPL-SCNC: 25 MMOL/L
CREAT SERPL-MCNC: 0.61 MG/DL
EGFR: 124 ML/MIN/1.73M2
EOSINOPHIL # BLD AUTO: 0.03 K/UL
EOSINOPHIL NFR BLD AUTO: 0.6 %
GLUCOSE SERPL-MCNC: 95 MG/DL
HCT VFR BLD CALC: 38.5 %
HDLC SERPL-MCNC: 57 MG/DL
HGB BLD-MCNC: 12.1 G/DL
IMM GRANULOCYTES NFR BLD AUTO: 0.2 %
LDLC SERPL CALC-MCNC: 157 MG/DL
LYMPHOCYTES # BLD AUTO: 1.78 K/UL
LYMPHOCYTES NFR BLD AUTO: 36.8 %
MAN DIFF?: NORMAL
MCHC RBC-ENTMCNC: 28.1 PG
MCHC RBC-ENTMCNC: 31.4 GM/DL
MCV RBC AUTO: 89.5 FL
MONOCYTES # BLD AUTO: 0.29 K/UL
MONOCYTES NFR BLD AUTO: 6 %
NEUTROPHILS # BLD AUTO: 2.69 K/UL
NEUTROPHILS NFR BLD AUTO: 55.6 %
NONHDLC SERPL-MCNC: 171 MG/DL
PLATELET # BLD AUTO: 252 K/UL
POTASSIUM SERPL-SCNC: 4.4 MMOL/L
PROT SERPL-MCNC: 7.5 G/DL
RBC # BLD: 4.3 M/UL
RBC # FLD: 13.2 %
SODIUM SERPL-SCNC: 141 MMOL/L
TRIGL SERPL-MCNC: 81 MG/DL
WBC # FLD AUTO: 4.84 K/UL

## 2023-12-07 PROCEDURE — 76856 US EXAM PELVIC COMPLETE: CPT

## 2023-12-07 PROCEDURE — 76830 TRANSVAGINAL US NON-OB: CPT

## 2023-12-11 ENCOUNTER — OUTPATIENT (OUTPATIENT)
Dept: OUTPATIENT SERVICES | Facility: HOSPITAL | Age: 28
LOS: 1 days | End: 2023-12-11
Payer: MEDICAID

## 2023-12-11 ENCOUNTER — APPOINTMENT (OUTPATIENT)
Dept: ULTRASOUND IMAGING | Facility: CLINIC | Age: 28
End: 2023-12-11
Payer: MEDICAID

## 2023-12-11 DIAGNOSIS — N92.0 EXCESSIVE AND FREQUENT MENSTRUATION WITH REGULAR CYCLE: ICD-10-CM

## 2023-12-11 PROCEDURE — 93971 EXTREMITY STUDY: CPT | Mod: 26,RT

## 2023-12-11 PROCEDURE — 93971 EXTREMITY STUDY: CPT

## 2023-12-12 ENCOUNTER — NON-APPOINTMENT (OUTPATIENT)
Age: 28
End: 2023-12-12

## 2024-04-12 ENCOUNTER — NON-APPOINTMENT (OUTPATIENT)
Age: 29
End: 2024-04-12

## 2024-10-02 ENCOUNTER — EMERGENCY (EMERGENCY)
Facility: HOSPITAL | Age: 29
LOS: 1 days | Discharge: HOME | End: 2024-10-02
Attending: EMERGENCY MEDICINE
Payer: COMMERCIAL

## 2024-10-02 VITALS
TEMPERATURE: 98 F | HEART RATE: 76 BPM | OXYGEN SATURATION: 98 % | SYSTOLIC BLOOD PRESSURE: 127 MMHG | RESPIRATION RATE: 18 BRPM | DIASTOLIC BLOOD PRESSURE: 70 MMHG

## 2024-10-02 VITALS
WEIGHT: 139.99 LBS | DIASTOLIC BLOOD PRESSURE: 82 MMHG | HEART RATE: 72 BPM | TEMPERATURE: 99 F | HEIGHT: 60 IN | OXYGEN SATURATION: 99 % | RESPIRATION RATE: 18 BRPM | SYSTOLIC BLOOD PRESSURE: 122 MMHG

## 2024-10-02 PROCEDURE — 99283 EMERGENCY DEPT VISIT LOW MDM: CPT

## 2024-10-02 PROCEDURE — 99284 EMERGENCY DEPT VISIT MOD MDM: CPT

## 2024-10-02 RX ORDER — IBUPROFEN 200 MG
600 TABLET ORAL ONCE
Refills: 0 | Status: COMPLETED | OUTPATIENT
Start: 2024-10-02 | End: 2024-10-02

## 2024-10-02 RX ORDER — METHOCARBAMOL 500 MG/1
2 TABLET, FILM COATED ORAL
Qty: 10 | Refills: 0
Start: 2024-10-02 | End: 2024-10-06

## 2024-10-02 RX ORDER — ACETAMINOPHEN 500 MG/5ML
975 LIQUID (ML) ORAL ONCE
Refills: 0 | Status: COMPLETED | OUTPATIENT
Start: 2024-10-02 | End: 2024-10-02

## 2024-10-02 RX ORDER — METHOCARBAMOL 500 MG/1
1500 TABLET, FILM COATED ORAL ONCE
Refills: 0 | Status: COMPLETED | OUTPATIENT
Start: 2024-10-02 | End: 2024-10-02

## 2024-10-02 RX ADMIN — METHOCARBAMOL 1500 MILLIGRAM(S): 500 TABLET, FILM COATED ORAL at 17:00

## 2024-10-02 RX ADMIN — Medication 975 MILLIGRAM(S): at 17:00

## 2024-10-02 RX ADMIN — Medication 600 MILLIGRAM(S): at 17:00

## 2025-03-12 ENCOUNTER — NON-APPOINTMENT (OUTPATIENT)
Age: 30
End: 2025-03-12

## 2025-03-18 ENCOUNTER — APPOINTMENT (OUTPATIENT)
Dept: INTERNAL MEDICINE | Facility: CLINIC | Age: 30
End: 2025-03-18

## 2025-06-02 DIAGNOSIS — Z02.1 ENCOUNTER FOR PRE-EMPLOYMENT EXAMINATION: ICD-10-CM

## 2025-06-02 DIAGNOSIS — Z13.21 ENCOUNTER FOR SCREENING FOR NUTRITIONAL DISORDER: ICD-10-CM

## 2025-06-02 DIAGNOSIS — Z13.1 ENCOUNTER FOR SCREENING FOR DIABETES MELLITUS: ICD-10-CM

## 2025-06-02 DIAGNOSIS — Z13.29 ENCOUNTER FOR SCREENING FOR OTHER SUSPECTED ENDOCRINE DISORDER: ICD-10-CM

## 2025-06-02 DIAGNOSIS — Z13.220 ENCOUNTER FOR SCREENING FOR LIPOID DISORDERS: ICD-10-CM

## 2025-06-07 LAB
25(OH)D3 SERPL-MCNC: 11.5 NG/ML
ALBUMIN SERPL ELPH-MCNC: 4.5 G/DL
ALP BLD-CCNC: 66 U/L
ALT SERPL-CCNC: 9 U/L
ANION GAP SERPL CALC-SCNC: 13 MMOL/L
AST SERPL-CCNC: 11 U/L
BASOPHILS # BLD AUTO: 0.04 K/UL
BASOPHILS NFR BLD AUTO: 0.6 %
BILIRUB SERPL-MCNC: <0.2 MG/DL
BUN SERPL-MCNC: 15 MG/DL
CALCIUM SERPL-MCNC: 9.2 MG/DL
CHLORIDE SERPL-SCNC: 107 MMOL/L
CHOLEST SERPL-MCNC: 218 MG/DL
CO2 SERPL-SCNC: 21 MMOL/L
CREAT SERPL-MCNC: 0.82 MG/DL
EGFRCR SERPLBLD CKD-EPI 2021: 99 ML/MIN/1.73M2
EOSINOPHIL # BLD AUTO: 0.07 K/UL
EOSINOPHIL NFR BLD AUTO: 1 %
ESTIMATED AVERAGE GLUCOSE: 114 MG/DL
GLUCOSE SERPL-MCNC: 105 MG/DL
HBA1C MFR BLD HPLC: 5.6 %
HCT VFR BLD CALC: 38 %
HDLC SERPL-MCNC: 52 MG/DL
HGB BLD-MCNC: 12.4 G/DL
IMM GRANULOCYTES NFR BLD AUTO: 0.3 %
LDLC SERPL-MCNC: 151 MG/DL
LYMPHOCYTES # BLD AUTO: 2.33 K/UL
LYMPHOCYTES NFR BLD AUTO: 34.7 %
MAN DIFF?: NORMAL
MCHC RBC-ENTMCNC: 28.8 PG
MCHC RBC-ENTMCNC: 32.6 G/DL
MCV RBC AUTO: 88.4 FL
MONOCYTES # BLD AUTO: 0.49 K/UL
MONOCYTES NFR BLD AUTO: 7.3 %
NEUTROPHILS # BLD AUTO: 3.77 K/UL
NEUTROPHILS NFR BLD AUTO: 56.1 %
NONHDLC SERPL-MCNC: 166 MG/DL
PLATELET # BLD AUTO: 246 K/UL
POTASSIUM SERPL-SCNC: 4.2 MMOL/L
PROT SERPL-MCNC: 7.1 G/DL
RBC # BLD: 4.3 M/UL
RBC # FLD: 13 %
SODIUM SERPL-SCNC: 141 MMOL/L
TRIGL SERPL-MCNC: 84 MG/DL
TSH SERPL-ACNC: 1.14 UIU/ML
VIT B12 SERPL-MCNC: 375 PG/ML
WBC # FLD AUTO: 6.72 K/UL

## 2025-06-08 LAB
MEV IGG FLD QL IA: 147 AU/ML
MEV IGG+IGM SER-IMP: POSITIVE
MUV AB SER-ACNC: POSITIVE
MUV IGG SER QL IA: 52.8 AU/ML
RUBV IGG FLD-ACNC: 7.49 INDEX
RUBV IGG SER-IMP: POSITIVE

## 2025-06-11 LAB
M TB IFN-G BLD-IMP: NEGATIVE
QUANTIFERON TB PLUS MITOGEN MINUS NIL: >10 IU/ML
QUANTIFERON TB PLUS NIL: 0.07 IU/ML
QUANTIFERON TB PLUS TB1 MINUS NIL: 0 IU/ML
QUANTIFERON TB PLUS TB2 MINUS NIL: 0 IU/ML

## 2025-06-17 ENCOUNTER — APPOINTMENT (OUTPATIENT)
Dept: INTERNAL MEDICINE | Facility: CLINIC | Age: 30
End: 2025-06-17
Payer: MEDICAID

## 2025-06-17 VITALS
BODY MASS INDEX: 24.24 KG/M2 | HEIGHT: 64 IN | WEIGHT: 142 LBS | DIASTOLIC BLOOD PRESSURE: 75 MMHG | SYSTOLIC BLOOD PRESSURE: 113 MMHG | HEART RATE: 73 BPM | OXYGEN SATURATION: 100 %

## 2025-06-17 PROCEDURE — 99385 PREV VISIT NEW AGE 18-39: CPT

## 2025-06-17 PROCEDURE — 99214 OFFICE O/P EST MOD 30 MIN: CPT | Mod: 25

## 2025-06-24 ENCOUNTER — APPOINTMENT (OUTPATIENT)
Dept: ULTRASOUND IMAGING | Facility: CLINIC | Age: 30
End: 2025-06-24

## 2025-07-17 ENCOUNTER — APPOINTMENT (OUTPATIENT)
Dept: OBGYN | Facility: CLINIC | Age: 30
End: 2025-07-17